# Patient Record
Sex: MALE | Race: WHITE | Employment: UNEMPLOYED | URBAN - METROPOLITAN AREA
[De-identification: names, ages, dates, MRNs, and addresses within clinical notes are randomized per-mention and may not be internally consistent; named-entity substitution may affect disease eponyms.]

---

## 2022-01-01 ENCOUNTER — HOSPITAL ENCOUNTER (EMERGENCY)
Age: 72
Discharge: HOME | End: 2022-01-01
Attending: EMERGENCY MEDICINE
Payer: MEDICARE

## 2022-01-01 ENCOUNTER — APPOINTMENT (OUTPATIENT)
Dept: GENERAL RADIOLOGY | Age: 72
End: 2022-01-01

## 2022-01-01 VITALS
HEART RATE: 86 BPM | TEMPERATURE: 97.9 F | WEIGHT: 260 LBS | OXYGEN SATURATION: 93 % | HEIGHT: 68 IN | SYSTOLIC BLOOD PRESSURE: 107 MMHG | BODY MASS INDEX: 39.4 KG/M2 | RESPIRATION RATE: 25 BRPM | DIASTOLIC BLOOD PRESSURE: 54 MMHG

## 2022-01-01 DIAGNOSIS — I95.9 HYPOTENSION, UNSPECIFIED HYPOTENSION TYPE: Primary | ICD-10-CM

## 2022-01-01 LAB
A/G RATIO: 1 (ref 1.1–2.2)
ALBUMIN SERPL-MCNC: 3.3 G/DL (ref 3.4–5)
ALP BLD-CCNC: 82 U/L (ref 40–129)
ALT SERPL-CCNC: 9 U/L (ref 10–40)
ANION GAP SERPL CALCULATED.3IONS-SCNC: 12 MMOL/L (ref 3–16)
AST SERPL-CCNC: 10 U/L (ref 15–37)
BASOPHILS ABSOLUTE: 0.1 K/UL (ref 0–0.2)
BASOPHILS RELATIVE PERCENT: 0.8 %
BILIRUB SERPL-MCNC: <0.2 MG/DL (ref 0–1)
BUN BLDV-MCNC: 22 MG/DL (ref 7–20)
CALCIUM SERPL-MCNC: 8.8 MG/DL (ref 8.3–10.6)
CHLORIDE BLD-SCNC: 100 MMOL/L (ref 99–110)
CO2: 22 MMOL/L (ref 21–32)
CREAT SERPL-MCNC: 2 MG/DL (ref 0.8–1.3)
EOSINOPHILS ABSOLUTE: 0.5 K/UL (ref 0–0.6)
EOSINOPHILS RELATIVE PERCENT: 4.6 %
GFR AFRICAN AMERICAN: 40
GFR NON-AFRICAN AMERICAN: 33
GLUCOSE BLD-MCNC: 194 MG/DL (ref 70–99)
HCT VFR BLD CALC: 31.9 % (ref 40.5–52.5)
HEMOGLOBIN: 10.3 G/DL (ref 13.5–17.5)
LYMPHOCYTES ABSOLUTE: 2.5 K/UL (ref 1–5.1)
LYMPHOCYTES RELATIVE PERCENT: 22.8 %
MCH RBC QN AUTO: 24.3 PG (ref 26–34)
MCHC RBC AUTO-ENTMCNC: 32.4 G/DL (ref 31–36)
MCV RBC AUTO: 74.9 FL (ref 80–100)
MONOCYTES ABSOLUTE: 0.9 K/UL (ref 0–1.3)
MONOCYTES RELATIVE PERCENT: 8.1 %
NEUTROPHILS ABSOLUTE: 6.9 K/UL (ref 1.7–7.7)
NEUTROPHILS RELATIVE PERCENT: 63.7 %
PDW BLD-RTO: 15.3 % (ref 12.4–15.4)
PLATELET # BLD: 335 K/UL (ref 135–450)
PMV BLD AUTO: 6.6 FL (ref 5–10.5)
POTASSIUM SERPL-SCNC: 4 MMOL/L (ref 3.5–5.1)
RBC # BLD: 4.26 M/UL (ref 4.2–5.9)
SODIUM BLD-SCNC: 134 MMOL/L (ref 136–145)
TOTAL PROTEIN: 6.7 G/DL (ref 6.4–8.2)
TROPONIN: <0.01 NG/ML
WBC # BLD: 10.8 K/UL (ref 4–11)

## 2022-01-01 PROCEDURE — 85025 COMPLETE CBC W/AUTO DIFF WBC: CPT

## 2022-01-01 PROCEDURE — 93005 ELECTROCARDIOGRAM TRACING: CPT | Performed by: PHYSICIAN ASSISTANT

## 2022-01-01 PROCEDURE — 99284 EMERGENCY DEPT VISIT MOD MDM: CPT

## 2022-01-01 PROCEDURE — 80053 COMPREHEN METABOLIC PANEL: CPT

## 2022-01-01 PROCEDURE — 84484 ASSAY OF TROPONIN QUANT: CPT

## 2022-01-01 PROCEDURE — 71045 X-RAY EXAM CHEST 1 VIEW: CPT

## 2022-01-01 RX ORDER — TRAMADOL HYDROCHLORIDE 50 MG/1
50 TABLET ORAL EVERY 6 HOURS PRN
COMMUNITY

## 2022-01-01 RX ORDER — CLOPIDOGREL BISULFATE 75 MG/1
75 TABLET ORAL DAILY
COMMUNITY

## 2022-01-01 RX ORDER — TAMSULOSIN HYDROCHLORIDE 0.4 MG/1
0.4 CAPSULE ORAL DAILY
COMMUNITY

## 2022-01-01 ASSESSMENT — ENCOUNTER SYMPTOMS
ABDOMINAL PAIN: 0
VOMITING: 0
COUGH: 0
NAUSEA: 0
DIARRHEA: 0
RHINORRHEA: 0
SHORTNESS OF BREATH: 0
WHEEZING: 0

## 2022-01-02 LAB
EKG ATRIAL RATE: 81 BPM
EKG DIAGNOSIS: NORMAL
EKG P AXIS: 16 DEGREES
EKG P-R INTERVAL: 184 MS
EKG Q-T INTERVAL: 394 MS
EKG QRS DURATION: 88 MS
EKG QTC CALCULATION (BAZETT): 457 MS
EKG R AXIS: -10 DEGREES
EKG T AXIS: 10 DEGREES
EKG VENTRICULAR RATE: 81 BPM

## 2022-01-02 PROCEDURE — 93010 ELECTROCARDIOGRAM REPORT: CPT | Performed by: INTERNAL MEDICINE

## 2022-01-02 NOTE — ED PROVIDER NOTES
"The patient location is: home  The chief complaint leading to consultation is: Patient requiring therapy for language impairment  Visit type: Virtual visit with synchronous audio and video  Total time spent with patient: 45 minutes  Each patient to whom he or she provides medical services by telemedicine is:  (1) informed of the relationship between the physician and patient and the respective role of any other health care provider with respect to management of the patient; and (2) notified that he or she may decline to receive medical services by telemedicine and may withdraw from such care at any time.    Notes:       Outpatient Pediatric Speech Therapy Daily Note    Date: 6/30/2020    Patient Name: Mp Euceda  MRN: [id number]  Therapy Diagnosis:   Encounter Diagnosis   Name Primary?    Autism spectrum disorder, requiring support, with accompanying language impairment Yes      Physician: Marlys Mon MD   Physician Orders: eval and treat   Medical Diagnosis: ASD  Age: 9  y.o. 7  m.o.    Visit # / Visits Authorized:20  Date of Evaluation:   Plan of Care Expiration Date: 12/31/20  Authorization Date: 2/5/20    Time In: 4:00 PM  Time Out: 5:00 PM  Total Billable Time: 60 min     Precautions: standard    Subjective:   Mp Euceda was seen today for speech therapy to address the above. The patient was seen via virtual visit in order to permit the patient to "shelter in place" and to reduce the risk of exposure to COVID-19.    He was compliant to home exercise program.   Response to previous treatment: good   Both parents brought Mp to therapy today.  Pain: Mp was unable to rate pain on a numeric scale, but no pain behaviors were noted in today's session.  Objective:   UNTIMED  Procedure Min.   Speech- Language- Voice Therapy    45   Total Untimed Units: 1  Charges Billed/# of units: 1  Short-term objectives:  Mp will:    1)New Goal: ABBI will identify the main idea of a passage " [facility]        Pt Name: Alessandra Gerard  MRN: [id number]  [place] 1950  Date of evaluation: 1/1/2022  Provider: Rock Zimmer PA-C  PCP: No primary care provider on file. Note Started: 9:31 PM EST        I have seen and evaluated this patient with my supervising physician Florie Fothergill, MD.    [facility]       Chief Complaint   Patient presents with    Dizziness     pt brouoght to ed by [place] [facility] for sudden onset of dizziness and low blood pressure        HISTORY OF PRESENT ILLNESS   (Location, Timing/Onset, Context/Setting, Quality, Duration, Modifying Factors, Severity, Associated Signs and Symptoms)  Note limiting factors. Chief Complaint: Jerardo Amador is a 70 y.o. male who presents for evaluation of hypotension. Patient states that about an hour prior to arrival in the ED he had just taken all of his evening meds and went to get ready for bed when he started feeling very dizzy/lightheaded, weak and diaphoretic. States that he felt this way before and felt like his blood pressure dropped and sure enough when he checked his blood pressure was 80s over 40s. EMS report systolic of 76 and gave fluid resuscitation prior to arrival in the ED. Patient states that he is already starting to feel better. He denies any chest pain or shortness of breath. States he has had similar symptoms in the past.  He denies syncope. He has no other complaints or concerns at this time. Nursing Notes were all reviewed and agreed with or any disagreements were addressed in the HPI. REVIEW OF SYSTEMS    (2-9 systems for level 4, 10 or more for level 5)     Review of Systems   Constitutional: Positive for diaphoresis. Negative for appetite change, chills and fever. HENT: Negative for congestion and rhinorrhea. Eyes: Negative for visual disturbance.    Respiratory: Negative for cough, shortness of breath and and provide 2 supporting details with 90% accuracy when provided with min cues.  2) ABBI will answer multi-part questions in entirety with 80% accuracy for punctuation and grammar.  3) New Goal: ABBI will complete character analysis, identifying character traits, feelings and changes in character in a passage with 80% accuracy when provided with mod cues.  4) Identify and utilize the accurate preposition in, at, or on, during written activities and conversational speech with 80% accuracy when provided with mod cues, across 3 consecutive sessions: Previous dataRJ completed written preposition tasks with 80% accuracy today. (progress maintained)    Goals met:  1. Answer wh- questions with 90% accuracy when provided with min cues across 3 consecutive sessions.Mp answered wh- questions today with 90% accuracy. (Goal met)  2. Demonstrate accurate use of past, present and future tense during conversational speech with 90% accuracy when provided with min cues across 3 consecutive sessions. (Goal met)  3. Demonstrate turn-taking with others during conversational speech with 90% accuracy when provided with in cues across 3 consecutive sessions.  (Goal met)  4.) ABBI will make inferences from reading passages with 90% accuracy when provided with mod cues.(goal met)  5.)ABBI will find and highlight evidence in a passage to support his answer with 90% accuracy.  6)ABBI will complete moderately complex reading tasks and answer accompanying questions with 90% accuracy (goal met)    STG 5)  6/30/20-ABBI completed reading activities today, finding and highlighing the evidence in passages to support answers to questions about what he read, with 90% accuracy when mod cues were provided. (progress made)      Long-term goals:  Mp will exhibit:  1. Age appropriate receptive language skills.  2. Age appropriate expressive language skills.  3. Age appropriate pragmatic language skills.  Patient Education/Response:     Written Home Exercises  wheezing. Cardiovascular: Negative for chest pain. Gastrointestinal: Negative for abdominal pain, diarrhea, nausea and vomiting. Genitourinary: Negative for difficulty urinating, dysuria and hematuria. Musculoskeletal: Negative for neck pain and neck stiffness. Skin: Negative for rash. Neurological: Positive for dizziness, weakness and light-headedness. Negative for syncope, numbness and headaches. Positives and Pertinent negatives as per HPI. Except as noted above in the ROS, all other systems were reviewed and negative.        PAST MEDICAL HISTORY     Past Medical History:   Diagnosis Date    [facility] [address]     Diabetes mellitus [facility] [address] [facility]     Hyperlipidemia     Hypertension          SURGICAL HISTORY     Past Surgical History:   Procedure Laterality Date    APPENDECTOMY           CURRENTMEDICATIONS       Previous Medications    ALBUTEROL (ACCUNEB) 0.63 MG/3ML NEBULIZER SOLUTION    Take 1 ampule by nebulization every 6 hours as needed for Wheezing    AMLODIPINE (NORVASC) 10 MG TABLET    Take 10 mg by mouth daily    APIXABAN (ELIQUIS) 5 MG TABS TABLET    Take by mouth 2 times daily    ATORVASTATIN (LIPITOR) 20 MG TABLET    Take 20 mg by mouth daily    BUDESONIDE-FORMOTEROL (SYMBICORT) 160-4.5 MCG/ACT AERO    Inhale 2 puffs into the lungs 2 times daily    CLOPIDOGREL (PLAVIX) 75 MG TABLET    Take 75 mg by mouth daily    FUROSEMIDE (LASIX) 40 MG TABLET    Take 40 mg by mouth daily     GLIPIZIDE (GLUCOTROL XL) 10 MG EXTENDED RELEASE TABLET    Take 10 mg by mouth 2 times daily    INSULIN GLARGINE (LANTUS) 100 UNIT/ML INJECTION VIAL    Inject 60 Units into the skin nightly     LISINOPRIL (PRINIVIL;ZESTRIL) 20 MG TABLET    Take 10 mg by mouth daily     METFORMIN (GLUCOPHAGE) 850 MG TABLET    Take 850 mg by mouth 2 times daily (with meals)    TAMSULOSIN (FLOMAX) 0.4 MG CAPSULE    Take 0.4 mg by mouth daily    TRAMADOL (ULTRAM) 50 MG TABLET    Take 50 mg by mouth every 6 Provided: yes.  Strategies / Exercises were reviewed and Mp was able to demonstrate them prior to the end of the session.  Mp demonstrated good  understanding of the education provided.     See EMR under Patient Instructions for exercises provided prior visit  Assessment:   Mp is progressing toward his goals.   Current goals remain appropriate.  Goals will be added and re-assessed as needed.      Pt prognosis is Excellent. Pt will continue to benefit from skilled outpatient speech and language therapy to address the deficits listed in the problem list on initial evaluation, provide pt/famil0 y education and to maximize pt's level of independence in the home and community environment.     Medical necessity is demonstrated by the following IMPAIRMENTS:  ABBI continues to exhibit decreased receptive and expressive language impacting his ability to communicate information pertaining to his health and safety with parents, teachers, care-givers and medical professionals.    Barriers to Therapy: none identified  Pt's spiritual, cultural and educational needs considered and pt agreeable to plan of care and goals.  Plan:   Continue Plan of Care    Manda Bolanos CCC-SLP   6/30/2020         n the home program at the conclusion of the session and verbalized agreement with treatment plan.     hours as needed for Pain. ALLERGIES     Dilaudid [hydromorphone hcl] and Hydrochlorothiazide    FAMILYHISTORY     History reviewed. No pertinent family history. SOCIAL HISTORY       Social History     Tobacco Use    Smoking status: Current Every Day Smoker     Packs/day: 0.50     Types: Cigarettes    Smokeless tobacco: Never Used   Substance Use Topics    Alcohol use: No    Drug use: No       SCREENINGS             PHYSICAL EXAM    (up to 7 for level 4, 8 or more for level 5)     ED Triage Vitals   BP Temp Temp src Pulse Resp SpO2 Height Weight   -- -- -- -- -- -- -- --       Physical Exam  Vitals and nursing note reviewed. Constitutional:       General: He is not in acute distress. Appearance: He is well-developed. He is not ill-appearing, toxic-appearing or diaphoretic. HENT:      Head: Normocephalic and atraumatic. Right Ear: External ear normal.      Left Ear: External ear normal.      Nose: Nose normal.   Eyes:      General:         Right eye: No discharge. Left eye: No discharge. Extraocular Movements: Extraocular movements intact. Conjunctiva/sclera: Conjunctivae normal.      Pupils: Pupils are equal, round, and reactive to light. Cardiovascular:      Rate and Rhythm: Normal rate. Heart sounds: Normal heart sounds. Pulmonary:      Effort: Pulmonary effort is normal. No respiratory distress. Breath sounds: Normal breath sounds. Chest:      Chest wall: No tenderness. Abdominal:      General: There is no distension. Palpations: Abdomen is soft. Tenderness: There is no abdominal tenderness. Musculoskeletal:         General: Normal range of motion. Cervical back: Normal range of motion and neck supple. Skin:     General: Skin is warm and dry. Neurological:      Mental Status: He is alert and oriented to person, place, and time. Mental status is at baseline. GCS: GCS eye subscore is 4. GCS verbal subscore is 5.  GCS motor subscore is 6. Cranial Nerves: Cranial nerves are intact. Sensory: Sensation is intact. Motor: Motor function is intact. Coordination: Coordination is intact. Psychiatric:         Behavior: Behavior normal.         DIAGNOSTIC RESULTS   LABS:    Labs Reviewed   CBC WITH AUTO DIFFERENTIAL - Abnormal; Notable for the following components:       Result Value    Hemoglobin 10.3 (*)     Hematocrit 31.9 (*)     MCV 74.9 (*)     MCH 24.3 (*)     All other components within normal limits    Narrative:     Performed at:  [facility] [address] [place]   Phone [phone]   COMPREHENSIVE METABOLIC PANEL - Abnormal; Notable for the following components:    Sodium 134 (*)     Glucose 194 (*)     BUN 22 (*)     CREATININE 2.0 (*)     GFR Non- 33 (*)     GFR  40 (*)     Albumin 3.3 (*)     Albumin/Globulin Ratio 1.0 (*)     ALT 9 (*)     AST 10 (*)     All other components within normal limits    Narrative:     Performed at:  [facility] [address] [address] [place]   Phone [phone]   TROPONIN    Narrative:     Performed at:  [facility] [address], [place], [address] [place]   Phone [phone]       When ordered only abnormal lab results are displayed. All other labs were within normal range or not returned as of this dictation. EKG: When ordered, EKG's are interpreted by the Emergency Department Physician in the absence of a cardiologist.  Please see their note for interpretation of EKG.     RADIOLOGY:   Non-plain film images such as CT, Ultrasound and MRI are read by the radiologist. Plain radiographic images are visualized and preliminarily interpreted by the ED Provider with the below findings:        Interpretation per the Radiologist below, if available at the time of this note:    XR CHEST PORTABLE   Final Result   Marginal inspiration, without evidence of acute cardiopulmonary disease           No results found. PROCEDURES   Unless otherwise noted below, none     Procedures    CRITICAL CARE TIME   N/A    CONSULTS:  None      EMERGENCY DEPARTMENT COURSE and DIFFERENTIAL DIAGNOSIS/MDM:   Vitals:    Vitals:    01/01/22 2145 01/01/22 2149 01/01/22 2200 01/01/22 2215   BP: (!) 99/57 (!) 85/57 (!) 121/58 (!) 117/54   Pulse: 90 89 85 81   Resp: 20 17 18 15   Temp:       TempSrc:       SpO2: 93% 92% 92% 90%   Weight:       Height:           Patient was given the following medications:  Medications - No data to display        Patient presents for evaluation of hypotension and lightheadedness. On exam, he is resting comfortably in bed no acute distress and nontoxic. Vitals are stable and he is afebrile. Lungs are clear to auscultation bilaterally, chest is nontender and abdomen is benign. He is alert and oriented x3. GCS 15. Cranial nerves II through XII are intact. He is moving all extremities well difficulty or focal logic deficit. 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 Normally receives his care through the [place]. Patient artery received approximately 800 cc of fluid prior to arrival in the ED and had improvement in symptoms and resolution of hypotension. Please see attending note for EKG interpretation. CBC and CMP are remarkable for chronic kidney disease and creatinine of 2.0. He is hyperglycemic at 194 with no evidence of diabetic ketoacidosis. Troponin is negative. Chest x-ray is negative. Patient had normal pressures throughout entire stay in the ED. Asymptomatic with ambulation. I believe he can be safely discharged home at this time. Encouraged to hold his lisinopril x48 hours and follow-up with his PCP through the [place].     At this time there is no indication of head injury, encephalopathy, multiple sclerosis, TIA/CVA, seizures, dehydration, hypoglycemia, mass lesions, metabolic cause, cardiac arrhythmia, PE, GI bleeding or orthostatic hypotension. Patient is stable throughout ED course and safe for outpatient follow-up. Patient made aware of treatment plan and verbally understood and agreed. Patient stable for outpatient follow-up with their family doctor in 24-48 hours. He is agreeable to this plan stable for discharge at this time. FINAL IMPRESSION      1.  Hypotension, unspecified hypotension type          DISPOSITION/PLAN   DISPOSITION Decision To Discharge 01/01/2022 10:37:58 PM      PATIENT REFERRED TO:  your PCP through the [place]    Schedule an appointment as soon as possible for a visit       [facility] Emergency Department  [place] [address]  [phone]  Go to   If symptoms worsen      DISCHARGE MEDICATIONS:  New Prescriptions    No medications on file       DISCONTINUED MEDICATIONS:  Discontinued Medications    No medications on file              (Please note that portions of this note were completed with a voice recognition program.  Efforts were made to edit the dictations but occasionally words are mis-transcribed.)    Loida Hinton PA-C (electronically signed)           Chely Dobbs PA-C  01/01/22 8349

## 2022-01-02 NOTE — ED NOTES
Bed: 01  Expected date:   Expected time:   Means of arrival:   Comments:  laurie Blanchard RN  01/01/22 2125

## 2022-01-02 NOTE — ED NOTES
Discharge instructions given, patient acknowledged understanding, patient ambulated out of ed upon discharge      Alyx Luke RN  01/01/22 Danielito KELLER [address]

## 2022-01-02 NOTE — ED PROVIDER NOTES
As physician-in-triage, I performed a medical screening history and physical exam on William Sandoval. I also cared for and evaluated the patient in conjunction with the ED Advanced Practice Provider. All diagnostic, treatment, and disposition decisions were made by myself in conjunction with the advanced practice provider. For all further details of the patient's emergency department visit, please see the advanced practice provider's documentation. Presents to the ER for evaluation of orthostatic hypotension has had a prior history of orthostatic hypotension of taking his blood pressures and had some dizziness and was concerned about this. He has no headache no active chest pain pressure acute shortness breath. Mode of arrival is EMS. Acute on chronic orthostatic hypotension medication side effect in the setting of acute on chronic renal sufficiency.   Discontinue ACE inhibitor, hold blood pressure meds for the next 24 hours and outpatient follow-up with nephrology    Impression: Orthostatic hypotension, acute on chronic renal insufficiency     Tonny Acosta MD  45/30/76 1045       Tonny Acosta MD  51/00/69 1042

## 2024-08-18 ENCOUNTER — APPOINTMENT (OUTPATIENT)
Dept: CT IMAGING | Age: 74
DRG: 871 | End: 2024-08-18
Payer: MEDICARE

## 2024-08-18 ENCOUNTER — APPOINTMENT (OUTPATIENT)
Dept: GENERAL RADIOLOGY | Age: 74
DRG: 871 | End: 2024-08-18
Payer: MEDICARE

## 2024-08-18 ENCOUNTER — HOSPITAL ENCOUNTER (INPATIENT)
Age: 74
LOS: 9 days | Discharge: HOME HEALTH | DRG: 871 | End: 2024-08-27
Attending: STUDENT IN AN ORGANIZED HEALTH CARE EDUCATION/TRAINING PROGRAM | Admitting: STUDENT IN AN ORGANIZED HEALTH CARE EDUCATION/TRAINING PROGRAM
Payer: MEDICARE

## 2024-08-18 DIAGNOSIS — E87.20 LACTIC ACIDOSIS: ICD-10-CM

## 2024-08-18 DIAGNOSIS — A41.9 SEPTICEMIA (HCC): Primary | ICD-10-CM

## 2024-08-18 DIAGNOSIS — L03.116 CELLULITIS OF LEFT LEG: ICD-10-CM

## 2024-08-18 PROBLEM — L03.90 SEPSIS DUE TO CELLULITIS (HCC): Status: ACTIVE | Noted: 2024-08-18

## 2024-08-18 LAB
ALBUMIN SERPL-MCNC: 3.4 G/DL (ref 3.4–5)
ALBUMIN/GLOB SERPL: 0.8 {RATIO} (ref 1.1–2.2)
ALP SERPL-CCNC: 73 U/L (ref 40–129)
ALT SERPL-CCNC: 7 U/L (ref 10–40)
ANION GAP SERPL CALCULATED.3IONS-SCNC: 14 MMOL/L (ref 3–16)
APTT BLD: 33.2 SEC (ref 22.1–36.4)
AST SERPL-CCNC: 11 U/L (ref 15–37)
BASOPHILS # BLD: 0 K/UL (ref 0–0.2)
BASOPHILS NFR BLD: 0.2 %
BILIRUB SERPL-MCNC: 0.4 MG/DL (ref 0–1)
BUN SERPL-MCNC: 25 MG/DL (ref 7–20)
CALCIUM SERPL-MCNC: 8.7 MG/DL (ref 8.3–10.6)
CHLORIDE SERPL-SCNC: 102 MMOL/L (ref 99–110)
CK SERPL-CCNC: 139 U/L (ref 39–308)
CO2 SERPL-SCNC: 18 MMOL/L (ref 21–32)
CREAT SERPL-MCNC: 2.2 MG/DL (ref 0.8–1.3)
DEPRECATED RDW RBC AUTO: 17.3 % (ref 12.4–15.4)
EOSINOPHIL # BLD: 0 K/UL (ref 0–0.6)
EOSINOPHIL NFR BLD: 0 %
GFR SERPLBLD CREATININE-BSD FMLA CKD-EPI: 31 ML/MIN/{1.73_M2}
GLUCOSE SERPL-MCNC: 291 MG/DL (ref 70–99)
HCT VFR BLD AUTO: 35.3 % (ref 40.5–52.5)
HGB BLD-MCNC: 11.3 G/DL (ref 13.5–17.5)
INR PPP: 1.45 (ref 0.85–1.15)
LACTATE BLDV-SCNC: 3 MMOL/L (ref 0.4–1.9)
LYMPHOCYTES # BLD: 0.5 K/UL (ref 1–5.1)
LYMPHOCYTES NFR BLD: 2.8 %
MAGNESIUM SERPL-MCNC: 1.7 MG/DL (ref 1.8–2.4)
MCH RBC QN AUTO: 24.2 PG (ref 26–34)
MCHC RBC AUTO-ENTMCNC: 31.9 G/DL (ref 31–36)
MCV RBC AUTO: 75.9 FL (ref 80–100)
MONOCYTES # BLD: 0.8 K/UL (ref 0–1.3)
MONOCYTES NFR BLD: 4.6 %
NEUTROPHILS # BLD: 16.4 K/UL (ref 1.7–7.7)
NEUTROPHILS NFR BLD: 92.4 %
PHOSPHATE SERPL-MCNC: 2 MG/DL (ref 2.5–4.9)
PLATELET # BLD AUTO: 264 K/UL (ref 135–450)
PMV BLD AUTO: 7.3 FL (ref 5–10.5)
POTASSIUM SERPL-SCNC: 3.8 MMOL/L (ref 3.5–5.1)
PROCALCITONIN SERPL IA-MCNC: 2.24 NG/ML (ref 0–0.15)
PROT SERPL-MCNC: 7.9 G/DL (ref 6.4–8.2)
PROTHROMBIN TIME: 17.8 SEC (ref 11.9–14.9)
RBC # BLD AUTO: 4.65 M/UL (ref 4.2–5.9)
SODIUM SERPL-SCNC: 134 MMOL/L (ref 136–145)
TROPONIN, HIGH SENSITIVITY: 34 NG/L (ref 0–22)
WBC # BLD AUTO: 17.8 K/UL (ref 4–11)

## 2024-08-18 PROCEDURE — 80053 COMPREHEN METABOLIC PANEL: CPT

## 2024-08-18 PROCEDURE — 83735 ASSAY OF MAGNESIUM: CPT

## 2024-08-18 PROCEDURE — 81001 URINALYSIS AUTO W/SCOPE: CPT

## 2024-08-18 PROCEDURE — 84484 ASSAY OF TROPONIN QUANT: CPT

## 2024-08-18 PROCEDURE — 2060000000 HC ICU INTERMEDIATE R&B

## 2024-08-18 PROCEDURE — 6360000002 HC RX W HCPCS: Performed by: STUDENT IN AN ORGANIZED HEALTH CARE EDUCATION/TRAINING PROGRAM

## 2024-08-18 PROCEDURE — 84145 PROCALCITONIN (PCT): CPT

## 2024-08-18 PROCEDURE — 83605 ASSAY OF LACTIC ACID: CPT

## 2024-08-18 PROCEDURE — 82550 ASSAY OF CK (CPK): CPT

## 2024-08-18 PROCEDURE — 85610 PROTHROMBIN TIME: CPT

## 2024-08-18 PROCEDURE — 96361 HYDRATE IV INFUSION ADD-ON: CPT

## 2024-08-18 PROCEDURE — 85025 COMPLETE CBC W/AUTO DIFF WBC: CPT

## 2024-08-18 PROCEDURE — 6360000004 HC RX CONTRAST MEDICATION: Performed by: STUDENT IN AN ORGANIZED HEALTH CARE EDUCATION/TRAINING PROGRAM

## 2024-08-18 PROCEDURE — 96375 TX/PRO/DX INJ NEW DRUG ADDON: CPT

## 2024-08-18 PROCEDURE — 36415 COLL VENOUS BLD VENIPUNCTURE: CPT

## 2024-08-18 PROCEDURE — 99285 EMERGENCY DEPT VISIT HI MDM: CPT

## 2024-08-18 PROCEDURE — 93005 ELECTROCARDIOGRAM TRACING: CPT | Performed by: STUDENT IN AN ORGANIZED HEALTH CARE EDUCATION/TRAINING PROGRAM

## 2024-08-18 PROCEDURE — 71260 CT THORAX DX C+: CPT

## 2024-08-18 PROCEDURE — 85730 THROMBOPLASTIN TIME PARTIAL: CPT

## 2024-08-18 PROCEDURE — 96365 THER/PROPH/DIAG IV INF INIT: CPT

## 2024-08-18 PROCEDURE — 70450 CT HEAD/BRAIN W/O DYE: CPT

## 2024-08-18 PROCEDURE — 84100 ASSAY OF PHOSPHORUS: CPT

## 2024-08-18 PROCEDURE — 2580000003 HC RX 258: Performed by: STUDENT IN AN ORGANIZED HEALTH CARE EDUCATION/TRAINING PROGRAM

## 2024-08-18 PROCEDURE — 71045 X-RAY EXAM CHEST 1 VIEW: CPT

## 2024-08-18 PROCEDURE — 87040 BLOOD CULTURE FOR BACTERIA: CPT

## 2024-08-18 RX ORDER — SODIUM CHLORIDE, SODIUM LACTATE, POTASSIUM CHLORIDE, AND CALCIUM CHLORIDE .6; .31; .03; .02 G/100ML; G/100ML; G/100ML; G/100ML
30 INJECTION, SOLUTION INTRAVENOUS ONCE
Status: COMPLETED | OUTPATIENT
Start: 2024-08-18 | End: 2024-08-18

## 2024-08-18 RX ORDER — IOPAMIDOL 755 MG/ML
75 INJECTION, SOLUTION INTRAVASCULAR
Status: COMPLETED | OUTPATIENT
Start: 2024-08-18 | End: 2024-08-18

## 2024-08-18 RX ADMIN — SODIUM CHLORIDE, POTASSIUM CHLORIDE, SODIUM LACTATE AND CALCIUM CHLORIDE 2052 ML: 600; 310; 30; 20 INJECTION, SOLUTION INTRAVENOUS at 20:56

## 2024-08-18 RX ADMIN — IOPAMIDOL 75 ML: 755 INJECTION, SOLUTION INTRAVENOUS at 21:51

## 2024-08-18 RX ADMIN — CEFEPIME 2000 MG: 2 INJECTION, POWDER, FOR SOLUTION INTRAVENOUS at 20:57

## 2024-08-18 RX ADMIN — VANCOMYCIN HYDROCHLORIDE 2500 MG: 1 INJECTION, POWDER, LYOPHILIZED, FOR SOLUTION INTRAVENOUS at 21:37

## 2024-08-18 ASSESSMENT — PAIN - FUNCTIONAL ASSESSMENT: PAIN_FUNCTIONAL_ASSESSMENT: NONE - DENIES PAIN

## 2024-08-19 ENCOUNTER — APPOINTMENT (OUTPATIENT)
Dept: MRI IMAGING | Age: 74
DRG: 871 | End: 2024-08-19
Payer: MEDICARE

## 2024-08-19 PROBLEM — A41.9 SEPTICEMIA (HCC): Status: ACTIVE | Noted: 2024-08-19

## 2024-08-19 PROBLEM — L03.116 CELLULITIS OF LEFT LEG: Status: ACTIVE | Noted: 2024-08-19

## 2024-08-19 PROBLEM — G47.30 OBESITY WITH SLEEP APNEA: Status: ACTIVE | Noted: 2024-08-19

## 2024-08-19 PROBLEM — I25.10 CAD (CORONARY ARTERY DISEASE): Status: ACTIVE | Noted: 2024-08-19

## 2024-08-19 PROBLEM — E87.20 LACTIC ACIDOSIS: Status: ACTIVE | Noted: 2024-08-19

## 2024-08-19 PROBLEM — J44.1 COPD EXACERBATION (HCC): Status: ACTIVE | Noted: 2024-08-19

## 2024-08-19 PROBLEM — E83.39 HYPOPHOSPHATEMIA: Status: ACTIVE | Noted: 2024-08-19

## 2024-08-19 PROBLEM — R79.89 ELEVATED TROPONIN: Status: ACTIVE | Noted: 2024-08-19

## 2024-08-19 PROBLEM — R70.0 ELEVATED SED RATE: Status: ACTIVE | Noted: 2024-08-19

## 2024-08-19 PROBLEM — E66.9 OBESITY WITH SLEEP APNEA: Status: ACTIVE | Noted: 2024-08-19

## 2024-08-19 PROBLEM — E11.9 TYPE 2 DIABETES MELLITUS (HCC): Status: ACTIVE | Noted: 2024-08-19

## 2024-08-19 PROBLEM — E83.42 HYPOMAGNESEMIA: Status: ACTIVE | Noted: 2024-08-19

## 2024-08-19 PROBLEM — R79.89 ELEVATED LACTIC ACID LEVEL: Status: ACTIVE | Noted: 2024-08-19

## 2024-08-19 PROBLEM — R79.82 CRP ELEVATED: Status: ACTIVE | Noted: 2024-08-19

## 2024-08-19 PROBLEM — N17.9 ACUTE KIDNEY INJURY SUPERIMPOSED ON CHRONIC KIDNEY DISEASE (HCC): Status: ACTIVE | Noted: 2024-08-19

## 2024-08-19 PROBLEM — J96.21 ACUTE ON CHRONIC RESPIRATORY FAILURE WITH HYPOXIA (HCC): Status: ACTIVE | Noted: 2024-08-19

## 2024-08-19 PROBLEM — N18.9 ACUTE KIDNEY INJURY SUPERIMPOSED ON CHRONIC KIDNEY DISEASE (HCC): Status: ACTIVE | Noted: 2024-08-19

## 2024-08-19 LAB
ANION GAP SERPL CALCULATED.3IONS-SCNC: 12 MMOL/L (ref 3–16)
BACTERIA URNS QL MICRO: ABNORMAL /HPF
BASE EXCESS BLDV CALC-SCNC: 0.4 MMOL/L (ref -3–3)
BASOPHILS # BLD: 0 K/UL (ref 0–0.2)
BASOPHILS NFR BLD: 0.3 %
BILIRUB UR QL STRIP.AUTO: NEGATIVE
BUN SERPL-MCNC: 20 MG/DL (ref 7–20)
CALCIUM SERPL-MCNC: 8.1 MG/DL (ref 8.3–10.6)
CHLORIDE SERPL-SCNC: 103 MMOL/L (ref 99–110)
CK SERPL-CCNC: 300 U/L (ref 39–308)
CLARITY UR: CLEAR
CO2 BLDV-SCNC: 60 MMOL/L
CO2 SERPL-SCNC: 20 MMOL/L (ref 21–32)
COHGB MFR BLDV: 2.6 % (ref 0–1.5)
COLOR UR: YELLOW
CREAT SERPL-MCNC: 1.8 MG/DL (ref 0.8–1.3)
CRP SERPL-MCNC: 185.4 MG/L (ref 0–5.1)
DEPRECATED RDW RBC AUTO: 17.7 % (ref 12.4–15.4)
DO-HGB MFR BLDV: 19 %
EKG ATRIAL RATE: 109 BPM
EKG DIAGNOSIS: NORMAL
EKG P AXIS: 13 DEGREES
EKG P-R INTERVAL: 128 MS
EKG Q-T INTERVAL: 322 MS
EKG QRS DURATION: 88 MS
EKG QTC CALCULATION (BAZETT): 433 MS
EKG R AXIS: -17 DEGREES
EKG T AXIS: 72 DEGREES
EKG VENTRICULAR RATE: 109 BPM
EOSINOPHIL # BLD: 0 K/UL (ref 0–0.6)
EOSINOPHIL NFR BLD: 0 %
EPI CELLS #/AREA URNS AUTO: 1 /HPF (ref 0–5)
ERYTHROCYTE [SEDIMENTATION RATE] IN BLOOD BY WESTERGREN METHOD: 62 MM/HR (ref 0–20)
FLUAV RNA RESP QL NAA+PROBE: NOT DETECTED
FLUBV RNA RESP QL NAA+PROBE: NOT DETECTED
GFR SERPLBLD CREATININE-BSD FMLA CKD-EPI: 39 ML/MIN/{1.73_M2}
GLUCOSE BLD-MCNC: 183 MG/DL (ref 70–99)
GLUCOSE BLD-MCNC: 192 MG/DL (ref 70–99)
GLUCOSE BLD-MCNC: 347 MG/DL (ref 70–99)
GLUCOSE BLD-MCNC: 503 MG/DL (ref 70–99)
GLUCOSE SERPL-MCNC: 186 MG/DL (ref 70–99)
GLUCOSE UR STRIP.AUTO-MCNC: 100 MG/DL
HCO3 BLDV-SCNC: 25.5 MMOL/L (ref 23–29)
HCT VFR BLD AUTO: 31.5 % (ref 40.5–52.5)
HGB BLD-MCNC: 10.3 G/DL (ref 13.5–17.5)
HGB UR QL STRIP.AUTO: ABNORMAL
HYALINE CASTS #/AREA URNS AUTO: 4 /LPF (ref 0–8)
KETONES UR STRIP.AUTO-MCNC: NEGATIVE MG/DL
LACTATE BLDV-SCNC: 1.2 MMOL/L (ref 0.4–2)
LACTATE BLDV-SCNC: 1.4 MMOL/L (ref 0.4–1.9)
LEUKOCYTE ESTERASE UR QL STRIP.AUTO: ABNORMAL
LYMPHOCYTES # BLD: 0.5 K/UL (ref 1–5.1)
LYMPHOCYTES NFR BLD: 3.4 %
MCH RBC QN AUTO: 24.8 PG (ref 26–34)
MCHC RBC AUTO-ENTMCNC: 32.7 G/DL (ref 31–36)
MCV RBC AUTO: 75.9 FL (ref 80–100)
METHGB MFR BLDV: 0.7 %
MONOCYTES # BLD: 0.5 K/UL (ref 0–1.3)
MONOCYTES NFR BLD: 3.7 %
NEUTROPHILS # BLD: 13.7 K/UL (ref 1.7–7.7)
NEUTROPHILS NFR BLD: 92.6 %
NITRITE UR QL STRIP.AUTO: NEGATIVE
NT-PROBNP SERPL-MCNC: 3616 PG/ML (ref 0–124)
O2 CT VFR BLDV CALC: 11 VOL %
O2 THERAPY: ABNORMAL
PCO2 BLDV: 42.5 MMHG (ref 40–50)
PERFORMED ON: ABNORMAL
PH BLDV: 7.39 [PH] (ref 7.35–7.45)
PH UR STRIP.AUTO: 5.5 [PH] (ref 5–8)
PHOSPHATE SERPL-MCNC: 3.6 MG/DL (ref 2.5–4.9)
PLATELET # BLD AUTO: 210 K/UL (ref 135–450)
PMV BLD AUTO: 6.7 FL (ref 5–10.5)
PO2 BLDV: 43.7 MMHG (ref 25–40)
POTASSIUM SERPL-SCNC: 3.7 MMOL/L (ref 3.5–5.1)
PROT UR STRIP.AUTO-MCNC: 30 MG/DL
RBC # BLD AUTO: 4.14 M/UL (ref 4.2–5.9)
RBC CLUMPS #/AREA URNS AUTO: 74 /HPF (ref 0–4)
SAO2 % BLDV: 80 %
SARS-COV-2 RNA RESP QL NAA+PROBE: NOT DETECTED
SODIUM SERPL-SCNC: 135 MMOL/L (ref 136–145)
SP GR UR STRIP.AUTO: 1.02 (ref 1–1.03)
TROPONIN, HIGH SENSITIVITY: 35 NG/L (ref 0–22)
UA COMPLETE W REFLEX CULTURE PNL UR: ABNORMAL
UA DIPSTICK W REFLEX MICRO PNL UR: YES
URN SPEC COLLECT METH UR: ABNORMAL
UROBILINOGEN UR STRIP-ACNC: 0.2 E.U./DL
VANCOMYCIN SERPL-MCNC: 11.4 UG/ML
WBC # BLD AUTO: 14.8 K/UL (ref 4–11)
WBC #/AREA URNS AUTO: 6 /HPF (ref 0–5)

## 2024-08-19 PROCEDURE — 5A09457 ASSISTANCE WITH RESPIRATORY VENTILATION, 24-96 CONSECUTIVE HOURS, CONTINUOUS POSITIVE AIRWAY PRESSURE: ICD-10-PCS | Performed by: INTERNAL MEDICINE

## 2024-08-19 PROCEDURE — 2580000003 HC RX 258: Performed by: NURSE PRACTITIONER

## 2024-08-19 PROCEDURE — 94640 AIRWAY INHALATION TREATMENT: CPT

## 2024-08-19 PROCEDURE — 85025 COMPLETE CBC W/AUTO DIFF WBC: CPT

## 2024-08-19 PROCEDURE — 80202 ASSAY OF VANCOMYCIN: CPT

## 2024-08-19 PROCEDURE — 2500000003 HC RX 250 WO HCPCS: Performed by: STUDENT IN AN ORGANIZED HEALTH CARE EDUCATION/TRAINING PROGRAM

## 2024-08-19 PROCEDURE — 84100 ASSAY OF PHOSPHORUS: CPT

## 2024-08-19 PROCEDURE — 93010 ELECTROCARDIOGRAM REPORT: CPT | Performed by: INTERNAL MEDICINE

## 2024-08-19 PROCEDURE — 99255 IP/OBS CONSLTJ NEW/EST HI 80: CPT | Performed by: INTERNAL MEDICINE

## 2024-08-19 PROCEDURE — 6360000002 HC RX W HCPCS: Performed by: STUDENT IN AN ORGANIZED HEALTH CARE EDUCATION/TRAINING PROGRAM

## 2024-08-19 PROCEDURE — 80048 BASIC METABOLIC PNL TOTAL CA: CPT

## 2024-08-19 PROCEDURE — 2580000003 HC RX 258: Performed by: STUDENT IN AN ORGANIZED HEALTH CARE EDUCATION/TRAINING PROGRAM

## 2024-08-19 PROCEDURE — 36415 COLL VENOUS BLD VENIPUNCTURE: CPT

## 2024-08-19 PROCEDURE — 94761 N-INVAS EAR/PLS OXIMETRY MLT: CPT

## 2024-08-19 PROCEDURE — 83880 ASSAY OF NATRIURETIC PEPTIDE: CPT

## 2024-08-19 PROCEDURE — 2580000003 HC RX 258: Performed by: INTERNAL MEDICINE

## 2024-08-19 PROCEDURE — 2700000000 HC OXYGEN THERAPY PER DAY

## 2024-08-19 PROCEDURE — 6360000002 HC RX W HCPCS: Performed by: NURSE PRACTITIONER

## 2024-08-19 PROCEDURE — 87077 CULTURE AEROBIC IDENTIFY: CPT

## 2024-08-19 PROCEDURE — 87205 SMEAR GRAM STAIN: CPT

## 2024-08-19 PROCEDURE — 73718 MRI LOWER EXTREMITY W/O DYE: CPT

## 2024-08-19 PROCEDURE — 87070 CULTURE OTHR SPECIMN AEROBIC: CPT

## 2024-08-19 PROCEDURE — 6370000000 HC RX 637 (ALT 250 FOR IP): Performed by: NURSE PRACTITIONER

## 2024-08-19 PROCEDURE — 85652 RBC SED RATE AUTOMATED: CPT

## 2024-08-19 PROCEDURE — 82550 ASSAY OF CK (CPK): CPT

## 2024-08-19 PROCEDURE — 2060000000 HC ICU INTERMEDIATE R&B

## 2024-08-19 PROCEDURE — 6370000000 HC RX 637 (ALT 250 FOR IP): Performed by: INTERNAL MEDICINE

## 2024-08-19 PROCEDURE — 83605 ASSAY OF LACTIC ACID: CPT

## 2024-08-19 PROCEDURE — 6360000002 HC RX W HCPCS: Performed by: INTERNAL MEDICINE

## 2024-08-19 PROCEDURE — 82803 BLOOD GASES ANY COMBINATION: CPT

## 2024-08-19 PROCEDURE — 87636 SARSCOV2 & INF A&B AMP PRB: CPT

## 2024-08-19 PROCEDURE — 86140 C-REACTIVE PROTEIN: CPT

## 2024-08-19 PROCEDURE — 87186 SC STD MICRODIL/AGAR DIL: CPT

## 2024-08-19 PROCEDURE — 84484 ASSAY OF TROPONIN QUANT: CPT

## 2024-08-19 PROCEDURE — 94660 CPAP INITIATION&MGMT: CPT

## 2024-08-19 RX ORDER — NICOTINE 21 MG/24HR
1 PATCH, TRANSDERMAL 24 HOURS TRANSDERMAL DAILY
Status: DISCONTINUED | OUTPATIENT
Start: 2024-08-19 | End: 2024-08-27 | Stop reason: HOSPADM

## 2024-08-19 RX ORDER — BUDESONIDE AND FORMOTEROL FUMARATE DIHYDRATE 160; 4.5 UG/1; UG/1
2 AEROSOL RESPIRATORY (INHALATION) 2 TIMES DAILY
Status: DISCONTINUED | OUTPATIENT
Start: 2024-08-19 | End: 2024-08-27 | Stop reason: HOSPADM

## 2024-08-19 RX ORDER — CLOPIDOGREL BISULFATE 75 MG/1
75 TABLET ORAL DAILY
Status: DISCONTINUED | OUTPATIENT
Start: 2024-08-19 | End: 2024-08-27 | Stop reason: HOSPADM

## 2024-08-19 RX ORDER — ONDANSETRON 4 MG/1
4 TABLET, ORALLY DISINTEGRATING ORAL EVERY 8 HOURS PRN
Status: DISCONTINUED | OUTPATIENT
Start: 2024-08-19 | End: 2024-08-27 | Stop reason: HOSPADM

## 2024-08-19 RX ORDER — MAGNESIUM SULFATE IN WATER 40 MG/ML
2000 INJECTION, SOLUTION INTRAVENOUS ONCE
Status: COMPLETED | OUTPATIENT
Start: 2024-08-19 | End: 2024-08-19

## 2024-08-19 RX ORDER — TRAMADOL HYDROCHLORIDE 50 MG/1
50 TABLET ORAL EVERY 6 HOURS PRN
Status: DISCONTINUED | OUTPATIENT
Start: 2024-08-19 | End: 2024-08-27 | Stop reason: HOSPADM

## 2024-08-19 RX ORDER — INSULIN GLARGINE 100 [IU]/ML
60 INJECTION, SOLUTION SUBCUTANEOUS NIGHTLY
Status: DISCONTINUED | OUTPATIENT
Start: 2024-08-19 | End: 2024-08-27 | Stop reason: HOSPADM

## 2024-08-19 RX ORDER — ACETAMINOPHEN 650 MG/1
650 SUPPOSITORY RECTAL EVERY 6 HOURS PRN
Status: DISCONTINUED | OUTPATIENT
Start: 2024-08-19 | End: 2024-08-27 | Stop reason: HOSPADM

## 2024-08-19 RX ORDER — SODIUM CHLORIDE 0.9 % (FLUSH) 0.9 %
5-40 SYRINGE (ML) INJECTION EVERY 12 HOURS SCHEDULED
Status: DISCONTINUED | OUTPATIENT
Start: 2024-08-19 | End: 2024-08-27 | Stop reason: HOSPADM

## 2024-08-19 RX ORDER — INSULIN LISPRO 100 [IU]/ML
0-8 INJECTION, SOLUTION INTRAVENOUS; SUBCUTANEOUS
Status: DISCONTINUED | OUTPATIENT
Start: 2024-08-19 | End: 2024-08-20

## 2024-08-19 RX ORDER — TAMSULOSIN HYDROCHLORIDE 0.4 MG/1
0.4 CAPSULE ORAL DAILY
Status: DISCONTINUED | OUTPATIENT
Start: 2024-08-19 | End: 2024-08-27 | Stop reason: HOSPADM

## 2024-08-19 RX ORDER — POTASSIUM CHLORIDE 7.45 MG/ML
10 INJECTION INTRAVENOUS ONCE
Status: DISCONTINUED | OUTPATIENT
Start: 2024-08-19 | End: 2024-08-19

## 2024-08-19 RX ORDER — POLYETHYLENE GLYCOL 3350 17 G/17G
17 POWDER, FOR SOLUTION ORAL DAILY PRN
Status: DISCONTINUED | OUTPATIENT
Start: 2024-08-19 | End: 2024-08-27 | Stop reason: HOSPADM

## 2024-08-19 RX ORDER — INSULIN LISPRO 100 [IU]/ML
10 INJECTION, SOLUTION INTRAVENOUS; SUBCUTANEOUS ONCE
Status: COMPLETED | OUTPATIENT
Start: 2024-08-19 | End: 2024-08-19

## 2024-08-19 RX ORDER — MAGNESIUM SULFATE IN WATER 40 MG/ML
2000 INJECTION, SOLUTION INTRAVENOUS PRN
Status: DISCONTINUED | OUTPATIENT
Start: 2024-08-19 | End: 2024-08-27 | Stop reason: HOSPADM

## 2024-08-19 RX ORDER — SODIUM CHLORIDE 9 MG/ML
INJECTION, SOLUTION INTRAVENOUS PRN
Status: DISCONTINUED | OUTPATIENT
Start: 2024-08-19 | End: 2024-08-27 | Stop reason: HOSPADM

## 2024-08-19 RX ORDER — INSULIN LISPRO 100 [IU]/ML
0-4 INJECTION, SOLUTION INTRAVENOUS; SUBCUTANEOUS NIGHTLY
Status: DISCONTINUED | OUTPATIENT
Start: 2024-08-19 | End: 2024-08-20

## 2024-08-19 RX ORDER — ONDANSETRON 2 MG/ML
4 INJECTION INTRAMUSCULAR; INTRAVENOUS EVERY 6 HOURS PRN
Status: DISCONTINUED | OUTPATIENT
Start: 2024-08-19 | End: 2024-08-27 | Stop reason: HOSPADM

## 2024-08-19 RX ORDER — ALBUTEROL SULFATE 0.83 MG/ML
2.5 SOLUTION RESPIRATORY (INHALATION) EVERY 6 HOURS PRN
Status: DISCONTINUED | OUTPATIENT
Start: 2024-08-19 | End: 2024-08-25

## 2024-08-19 RX ORDER — ACETAMINOPHEN 325 MG/1
650 TABLET ORAL EVERY 6 HOURS PRN
Status: DISCONTINUED | OUTPATIENT
Start: 2024-08-19 | End: 2024-08-27 | Stop reason: HOSPADM

## 2024-08-19 RX ORDER — AMLODIPINE BESYLATE 5 MG/1
10 TABLET ORAL DAILY
Status: DISCONTINUED | OUTPATIENT
Start: 2024-08-19 | End: 2024-08-19

## 2024-08-19 RX ORDER — ALBUTEROL SULFATE 0.83 MG/ML
2.5 SOLUTION RESPIRATORY (INHALATION)
Status: DISCONTINUED | OUTPATIENT
Start: 2024-08-19 | End: 2024-08-20

## 2024-08-19 RX ORDER — INSULIN GLARGINE 100 [IU]/ML
60 INJECTION, SOLUTION SUBCUTANEOUS NIGHTLY
Status: DISCONTINUED | OUTPATIENT
Start: 2024-08-19 | End: 2024-08-19

## 2024-08-19 RX ORDER — SODIUM CHLORIDE 0.9 % (FLUSH) 0.9 %
5-40 SYRINGE (ML) INJECTION PRN
Status: DISCONTINUED | OUTPATIENT
Start: 2024-08-19 | End: 2024-08-27 | Stop reason: HOSPADM

## 2024-08-19 RX ORDER — GABAPENTIN 300 MG/1
300 CAPSULE ORAL 3 TIMES DAILY
Status: DISCONTINUED | OUTPATIENT
Start: 2024-08-19 | End: 2024-08-27 | Stop reason: HOSPADM

## 2024-08-19 RX ORDER — SODIUM CHLORIDE 9 MG/ML
INJECTION, SOLUTION INTRAVENOUS CONTINUOUS
Status: ACTIVE | OUTPATIENT
Start: 2024-08-19 | End: 2024-08-19

## 2024-08-19 RX ORDER — LABETALOL HYDROCHLORIDE 5 MG/ML
10 INJECTION, SOLUTION INTRAVENOUS EVERY 4 HOURS PRN
Status: DISCONTINUED | OUTPATIENT
Start: 2024-08-19 | End: 2024-08-27 | Stop reason: HOSPADM

## 2024-08-19 RX ORDER — GLUCAGON 1 MG/ML
1 KIT INJECTION PRN
Status: DISCONTINUED | OUTPATIENT
Start: 2024-08-19 | End: 2024-08-27 | Stop reason: HOSPADM

## 2024-08-19 RX ORDER — DEXTROSE MONOHYDRATE 100 MG/ML
INJECTION, SOLUTION INTRAVENOUS CONTINUOUS PRN
Status: DISCONTINUED | OUTPATIENT
Start: 2024-08-19 | End: 2024-08-27 | Stop reason: HOSPADM

## 2024-08-19 RX ORDER — ATORVASTATIN CALCIUM 20 MG/1
20 TABLET, FILM COATED ORAL DAILY
Status: DISCONTINUED | OUTPATIENT
Start: 2024-08-19 | End: 2024-08-27 | Stop reason: HOSPADM

## 2024-08-19 RX ADMIN — BUDESONIDE AND FORMOTEROL FUMARATE DIHYDRATE 2 PUFF: 160; 4.5 AEROSOL RESPIRATORY (INHALATION) at 08:02

## 2024-08-19 RX ADMIN — ALBUTEROL SULFATE 2.5 MG: 2.5 SOLUTION RESPIRATORY (INHALATION) at 08:00

## 2024-08-19 RX ADMIN — APIXABAN 5 MG: 5 TABLET, FILM COATED ORAL at 03:23

## 2024-08-19 RX ADMIN — Medication 10 ML: at 09:03

## 2024-08-19 RX ADMIN — CLOPIDOGREL BISULFATE 75 MG: 75 TABLET ORAL at 09:03

## 2024-08-19 RX ADMIN — SODIUM CHLORIDE, PRESERVATIVE FREE 10 ML: 5 INJECTION INTRAVENOUS at 03:17

## 2024-08-19 RX ADMIN — APIXABAN 5 MG: 5 TABLET, FILM COATED ORAL at 09:04

## 2024-08-19 RX ADMIN — WATER 40 MG: 1 INJECTION INTRAMUSCULAR; INTRAVENOUS; SUBCUTANEOUS at 09:04

## 2024-08-19 RX ADMIN — TRAMADOL HYDROCHLORIDE 50 MG: 50 TABLET ORAL at 16:53

## 2024-08-19 RX ADMIN — SODIUM CHLORIDE: 9 INJECTION, SOLUTION INTRAVENOUS at 03:19

## 2024-08-19 RX ADMIN — ALBUTEROL SULFATE 2.5 MG: 2.5 SOLUTION RESPIRATORY (INHALATION) at 20:54

## 2024-08-19 RX ADMIN — WATER 60 MG: 1 INJECTION INTRAMUSCULAR; INTRAVENOUS; SUBCUTANEOUS at 03:15

## 2024-08-19 RX ADMIN — TAMSULOSIN HYDROCHLORIDE 0.4 MG: 0.4 CAPSULE ORAL at 09:03

## 2024-08-19 RX ADMIN — POTASSIUM PHOSPHATE, MONOBASIC POTASSIUM PHOSPHATE, DIBASIC 15 MMOL: 224; 236 INJECTION, SOLUTION, CONCENTRATE INTRAVENOUS at 02:05

## 2024-08-19 RX ADMIN — MAGNESIUM SULFATE HEPTAHYDRATE 2000 MG: 40 INJECTION, SOLUTION INTRAVENOUS at 03:21

## 2024-08-19 RX ADMIN — ACETAMINOPHEN 650 MG: 325 TABLET ORAL at 11:40

## 2024-08-19 RX ADMIN — GABAPENTIN 300 MG: 300 CAPSULE ORAL at 20:15

## 2024-08-19 RX ADMIN — DAPTOMYCIN 550 MG: 500 INJECTION, POWDER, LYOPHILIZED, FOR SOLUTION INTRAVENOUS at 16:57

## 2024-08-19 RX ADMIN — INSULIN LISPRO 4 UNITS: 100 INJECTION, SOLUTION INTRAVENOUS; SUBCUTANEOUS at 20:59

## 2024-08-19 RX ADMIN — INSULIN LISPRO 10 UNITS: 100 INJECTION, SOLUTION INTRAVENOUS; SUBCUTANEOUS at 20:58

## 2024-08-19 RX ADMIN — INSULIN GLARGINE 60 UNITS: 100 INJECTION, SOLUTION SUBCUTANEOUS at 03:30

## 2024-08-19 RX ADMIN — APIXABAN 5 MG: 5 TABLET, FILM COATED ORAL at 20:15

## 2024-08-19 RX ADMIN — INSULIN GLARGINE 60 UNITS: 100 INJECTION, SOLUTION SUBCUTANEOUS at 20:39

## 2024-08-19 RX ADMIN — CEFEPIME 2000 MG: 2 INJECTION, POWDER, FOR SOLUTION INTRAVENOUS at 20:31

## 2024-08-19 RX ADMIN — GABAPENTIN 300 MG: 300 CAPSULE ORAL at 16:53

## 2024-08-19 RX ADMIN — CEFEPIME 2000 MG: 2 INJECTION, POWDER, FOR SOLUTION INTRAVENOUS at 09:09

## 2024-08-19 RX ADMIN — Medication 10 ML: at 20:15

## 2024-08-19 RX ADMIN — BUDESONIDE AND FORMOTEROL FUMARATE DIHYDRATE 2 PUFF: 160; 4.5 AEROSOL RESPIRATORY (INHALATION) at 20:54

## 2024-08-19 RX ADMIN — ACETAMINOPHEN 650 MG: 325 TABLET ORAL at 20:15

## 2024-08-19 RX ADMIN — WATER 40 MG: 1 INJECTION INTRAMUSCULAR; INTRAVENOUS; SUBCUTANEOUS at 16:54

## 2024-08-19 RX ADMIN — ATORVASTATIN CALCIUM 20 MG: 20 TABLET, FILM COATED ORAL at 09:03

## 2024-08-19 ASSESSMENT — PAIN DESCRIPTION - DESCRIPTORS
DESCRIPTORS: SHARP
DESCRIPTORS: DISCOMFORT
DESCRIPTORS: ACHING;CRUSHING;DISCOMFORT
DESCRIPTORS: DISCOMFORT

## 2024-08-19 ASSESSMENT — PAIN DESCRIPTION - LOCATION
LOCATION: ANKLE
LOCATION: LEG
LOCATION: FOOT

## 2024-08-19 ASSESSMENT — PAIN DESCRIPTION - ORIENTATION
ORIENTATION: LEFT
ORIENTATION: LEFT;RIGHT
ORIENTATION: RIGHT

## 2024-08-19 ASSESSMENT — PAIN SCALES - GENERAL
PAINLEVEL_OUTOF10: 9
PAINLEVEL_OUTOF10: 8
PAINLEVEL_OUTOF10: 3
PAINLEVEL_OUTOF10: 4
PAINLEVEL_OUTOF10: 7
PAINLEVEL_OUTOF10: 8

## 2024-08-19 ASSESSMENT — PAIN DESCRIPTION - PAIN TYPE: TYPE: ACUTE PAIN

## 2024-08-19 ASSESSMENT — PAIN DESCRIPTION - FREQUENCY: FREQUENCY: CONTINUOUS

## 2024-08-19 NOTE — ED NOTES
How does patient ambulate?   []Low Fall Risk (ambulates by themselves without support)  []Stand by assist   [x]Contact Guard   []Front wheel walker  []Wheelchair   []Steady  []Bed bound  []History of Lower Extremity Amputation  []Unknown, did not assess in the emergency department   How does patient take pills?  [x]Whole with Water  []Crushed in applesauce  []Crushed in pudding  []Other  []Unknown no oral medications were given in the ED  Is patient alert?   []Alert  [x]Drowsy but responds to voice  []Doesn't respond to voice but responds to painful stimuli  []Unresponsive  Is patient oriented?   [x]To person  []To place  []To time  []To situation  [x]Confused  []Agitated  [x]Follows commands  If patient is disoriented or from a Skill Nursing Facility has family been notified of admission?   []Yes   []No  Patient belongings?   [x]Cell phone  []Wallet   []Dentures  []Clothing  Any specific patient or family belongings/needs/dynamics?     Miscellaneous comments/pending orders?       If there are any additional questions please reach out to the Emergency Department.

## 2024-08-19 NOTE — CONSULTS
Infectious Diseases   Consult Note        Admission Date: 8/18/2024  Hospital Day: Hospital Day: 2   Attending: Alberto Ng MD  Date of service: 8/19/24     Reason for admission: Lactic acidosis [E87.20]  Septicemia (HCC) [A41.9]  Cellulitis of left leg [L03.116]  Sepsis due to cellulitis (HCC) [L03.90, A41.9]    Chief complaint/ Reason for consult:   Sepsis, left leg cellulitis, complicated left foot wound    Microbiology:        I have reviewed allavailable micro lab data and cultures    Blood culture (2/2) - collected on 8/18/2024: In process        Antibiotics and immunizations:       Current antibiotics: All antibiotics and their doses were reviewed by me    Recent Abx Admin                     ceFEPIme (MAXIPIME) 2,000 mg in sodium chloride 0.9 % 100 mL IVPB (mini-bag) (mg) 2,000 mg New Bag 08/19/24 0909    vancomycin (VANCOCIN) 2,500 mg in sodium chloride 0.9 % 500 mL IVPB (mg) 2,500 mg New Bag 08/18/24 2137    ceFEPIme (MAXIPIME) 2,000 mg in sodium chloride 0.9 % 100 mL IVPB (mini-bag) (mg) 2,000 mg New Bag 08/18/24 2057                      Immunization History: All immunization history was reviewed by me today.      There is no immunization history on file for this patient.    Known drug allergies:     All allergies were reviewed and updated    Allergies   Allergen Reactions    Dilaudid [Hydromorphone Hcl]      Violent     Hydrochlorothiazide Itching       Social history:     Social History:  All social andepidemiologic history was reviewed and updated by me today as needed.     Tobacco use:   reports that he has been smoking cigarettes. He has never used smokeless tobacco.  Alcohol use:   reports no history of alcohol use.  Currently lives in: [place] [id number]   reports no history of drug use.     COVID VACCINATION AND LAB RESULT RECORDS:     Internal Administration   First Dose      Second Dose           Last COVID Lab SARS-CoV-2 RNA, RT PCR (no units)   Date Value   08/19/2024 NOT  DETECTED            Assessment:     The patient is a 73 y.o. old male who  has a past medical history of COPD (chronic obstructive pulmonary disease) (HCC), Diabetes mellitus (HCC), Hyperlipidemia, and Hypertension. with following problems:    Sepsis with fever of 100.8, leukocytosis, tachycardia and tachypnea  Left lower extremity cellulitis  Failure of outpatient antibiotics  Acute kidney injury  Elevated procalcitonin of 2.24  Elevated CRP of 185  Elevated proBNP of greater than 3600  Elevated sed rate of 62  Essential hypertension  COPD  Mixed hyperlipidemia  Obesity Class 2 due to excess calorie intake : Body mass index is 39.53 kg/m².        Discussion:      The patient was hospitalized for sepsis syndrome.  2 sets of blood cultures have been sent out yesterday.  They are in process.    The patient had a rapid COVID-19 and influenza PCR test done earlier today.  It was negative.    The patient has a serum creatinine of 1.8 today.    Sed rate and CRP are significantly elevated.    White cell count is 14,800 today with hemoglobin of 10.3 and platelet count of 210,000.    I reviewed the images of the patient's chest x-ray, CT scan of the head and CT angiogram scan of the chest done with IV contrast yesterday independently interpreted the imaging studies.  No acute intracranial abnormalities or pulmonary embolism on the CT scans.  Chronic emphysematous changes noted.      Plan:     Diagnostic Workup:    Agree with blood cultures  Follow-up MRI of the left foot  Will order left foot wound culture  Will order baseline CK level exam started the patient on daptomycin  Continue to follow fever curve, WBC count and blood cultures  Follow up on liverand renal functions closely    Antimicrobials:    I stop IV vancomycin today in the setting of ongoing kidney issues  Will order IV daptomycin 550 mg every 24 hourly gram-positive coverage  Will continue IV cefepime at a renal dose of 2 g every 12 hour for empiric  friction rub.   Pulmonary:      Effort: No respiratory distress.      Breath sounds: No stridor. No wheezing or rales.   Abdominal:      General: Bowel sounds are normal.      Palpations: Abdomen is soft.      Tenderness: There is no abdominal tenderness. There is no guarding or rebound.   Musculoskeletal:         General: Swelling and tenderness present. No deformity. Normal range of motion.      Cervical back: Normal range of motion and neck supple.      Right lower leg: No edema.      Left lower leg: No edema.   Lymphadenopathy:      Cervical: No cervical adenopathy.   Skin:     General: Skin is warm and dry.      Coloration: Skin is not jaundiced.      Findings: Erythema present. No bruising or rash.      Comments: Left leg cellulitis with left foot wound noted   Neurological:      General: No focal deficit present.      Mental Status: He is alert and oriented to person, place, and time. Mental status is at baseline.      Motor: No abnormal muscle tone.   Psychiatric:         Mood and Affect: Mood normal.         Behavior: Behavior normal.           Lines and drains: All vascular access sites are healthy with no local erythema, discharge or tenderness.      Intake and output:     I/O last 3 completed shifts:  In: 653.5 [I.V.:42.5; IV Piggyback:611]  Out: -     Lab Data:   All available labs were reviewed by me today.     CBC:   Recent Labs     08/18/24 2037 08/19/24  0443   WBC 17.8* 14.8*   RBC 4.65 4.14*   HGB 11.3* 10.3*   HCT 35.3* 31.5*    210   MCV 75.9* 75.9*   MCH 24.2* 24.8*   MCHC 31.9 32.7   RDW 17.3* 17.7*        BMP:  Recent Labs     08/18/24 2037 08/19/24  0443   * 135*   K 3.8 3.7    103   CO2 18* 20*   BUN 25* 20   CREATININE 2.2* 1.8*   CALCIUM 8.7 8.1*   GLUCOSE 291* 186*        Hepatic FunctionPanel:   Lab Results   Component Value Date/Time    ALKPHOS 73 08/18/2024 08:37 PM    ALT 7 08/18/2024 08:37 PM    AST 11 08/18/2024 08:37 PM    BILITOT 0.4 08/18/2024 08:37 PM

## 2024-08-19 NOTE — PROGRESS NOTES
Hospitalist Progress Note      PCP: No primary care provider on file.    Date of Admission: 8/18/2024    LOS: 1    Chief Complaint:   Chief Complaint   Patient presents with    Fatigue     Patient arrives from home via [facility]  with complaints of weakness. FD was called earlier for lift assist. Patient reports that he didn't fall but he just couldn't get up. Patient reports having cellulitis to left leg and taking antibiotics. Has become increasingly weak. Hot to touch. Tachycardic en route. Patient denies any injuries from falling.        Case Summary:   73-year-old gentleman with history of COPD, type 2 diabetes, hypertension, VTE, CAD, obesity with sleep apnea on CPAP, hyperlipidemia, stage III CKD with baseline creatinine of 1.6-1.7 who [facility] lower [facility]ulitis which has progressively worsened with increased pain erythema and swelling.  Patient has had recurrent falls at home.  Presented with lethargy, fatigue and fever found to have sepsis due to left lower extremity cellulitis complicated by acute exacerbation of COPD with hypoxia, acute on chronic kidney disease, elevated lactic acid, elevated troponins, electrolyte abnormalities with hypomagnesemia and hypophosphatemia.        Active Hospital Problems    Diagnosis Date Noted    COPD exacerbation (HCC) [J44.1] 08/19/2024    Acute on chronic respiratory failure with hypoxia (HCC) [J96.21] 08/19/2024    Acute kidney injury superimposed on chronic kidney disease (HCC) [N17.9, N18.9] 08/19/2024    Elevated lactic acid level [R79.89] 08/19/2024    Elevated troponin [R79.89] 08/19/2024    Hypomagnesemia [E83.42] 08/19/2024    Hypophosphatemia [E83.39] 08/19/2024    Type 2 diabetes mellitus (HCC) [E11.9] 08/19/2024    Obesity with sleep apnea [G47.30, E66.9] 08/19/2024    CAD (coronary artery disease) [I25.10] 08/19/2024    Sepsis due to cellulitis (HCC) [L03.90, A41.9] 08/18/2024  clopidogrel  75 mg Oral Daily    insulin glargine  60 Units SubCUTAneous Nightly    tamsulosin  0.4 mg Oral Daily    sodium chloride flush  5-40 mL IntraVENous 2 times per day    methylPREDNISolone  40 mg IntraVENous Q8H    nicotine  1 patch TransDERmal Daily    albuterol  2.5 mg Nebulization BID RT    vancomycin (VANCOCIN) intermittent dosing (placeholder)   Other RX Placeholder    vancomycin  1,500 mg IntraVENous Q24H     PRN Meds: albuterol, labetalol, sodium chloride flush, sodium chloride, magnesium sulfate, ondansetron **OR** ondansetron, polyethylene glycol, acetaminophen **OR** acetaminophen, sodium phosphate 15 mmol in sodium chloride 0.9 % 250 mL IVPB, dextrose bolus **OR** dextrose bolus, glucagon (rDNA), dextrose      DVT Prophylaxis: On Eliquis  Diet: ADULT DIET; Regular; 4 carb choices (60 gm/meal)  Code Status: Full Code    Dispo: Anticipate discharge in 4 days pending clinical improvement    ____________________________________________________________________________    Subjective:   Overnight Events:   Uneventful overnight  Expresses pain to the left lower extremity        Physical Exam:  BP (!) 150/70   Pulse 96   Temp 99 °F (37.2 °C) (Axillary)   Resp 18   Ht 1.727 m (5' 8\")   Wt 117.9 kg (260 lb)   SpO2 93%   BMI 39.53 kg/m²   General appearance: No apparent distress, appears stated age and cooperative.  HEENT: Normocephalic, atraumatic, MMM, No sclera icterus/conjuctival palor  Neck: Supple, no thyromegally. No jugular venous distention.   Respiratory:  Normal respiratory effort. Clear to auscultation, no Rales/Wheezes/Rhonchi.  Cardiovascular: S1/S2 without murmurs, rubs or gallops. RRR  Abdomen: Soft, non-tender, non-distended, bowel sounds present.  Musculoskeletal: No clubbing, cyanosis or edema bilaterally.  Extensive erythema noted with significant swelling of the left foot.    Skin: Left lower extremity erythema with cellulitis  Neurologic:  Cranial nerves: II-XII intact, FAZAL, No

## 2024-08-19 NOTE — CARE COORDINATION
Chart reviewed at this time. States from home. CM will verify address as he has a [place] address listed.    On IV abx Cefepime and Vancomycin.    Therapy evaluations are pending.    CM will follow for discharge plan and needs.    Clari See RN, BSN  [phone]

## 2024-08-19 NOTE — H&P
V2.0  History and Physical      Name:  Gamal Blue /Age/Sex: 1950  (73 y.o. male)   MRN & CSN:  [id number] & [id number] Encounter Date/Time: 2024 11:49 PM EDT   Location:  - PCP: No primary care provider on file.       Hospital Day: 1    Assessment and Plan:   Gamal Blue is a 73 y.o. male with a pmh of COPD, DM2, obstructive sleep apnea on CPAP at at bedtime, hyperlipidemia, hypertension, nicotine dependence, DVT on Eliquis, CAD.  [facility].  He presents for evaluation of recurrent falls, fatigue/lethargy.  He met sepsis criteria with source as left leg cellulitis.  Antibiotics started.  He is also hypoxic on ED presentation requiring nonrebreather.  He is pending VBG.    Hospital Problems             Last Modified POA    * (Principal) Sepsis due to cellulitis ([facility]) 2024 Yes       Plan:  # Sepsis due to left leg cellulitis.  # Left leg cellulitis-failed [facility]  # Lactic acidosis.  -Admit to stepdown telemetry.  -Continue Vanco and cefepime pending cultures.  -Consult wound care  -Trend lactic.      # ?  Multifocal pneumonia versus pulmonary edema-Per chest x-ray  # COPD with acute exacerbation.-Emphysema per chest x-ray.  # Acute respiratory failure with hypoxia likely due to above.  # Obstructive sleep apnea-with CPAP at home.  -He required nonrebreather on presentation.  Currently on 5 L O2 via nasal cannula.  No O2 use baseline.  -No acute PE per CT chest.  -Pending stat VBG.  -Stat CPAP/BiPAP pending gases.  -Steroids, resume home inhalers.  -Continue antibiotics as above.  -Check viral panel.  -He is at high risk for intubation.    # Recurrent falls.   # Lethargy/generalized weakness.  -CT head without acute findings.  -Normal ammonia.  - initial encounter.  He reports falls due to unsteady gait.  Consult PT OT continue fall precautions.    # Lactic acidosis.  Likely due to above.  the mA/kV was utilized to reduce the radiation dose to as low as reasonably achievable. COMPARISON: Correlation with concurrent chest radiograph HISTORY: Acute weakness, fatigue, tachycardia, and hypoxia. FINDINGS: Image quality degraded by motion artifact. Pulmonary Arteries: Pulmonary arteries are adequately opacified for evaluation.  No intraluminal filling defect seen.  Main pulmonary artery is normal in caliber. Mediastinum: Heart is not enlarged. Systemic and coronary atherosclerotic calcification.  No pericardial effusion.  Thoracic aorta is normal caliber. No lymphadenopathy.  Thyroid and esophagus are unremarkable. Lungs/pleura: Upper lobe predominant emphysema.  Dependent atelectasis.  No consolidation, pleural effusion, or pneumothorax.. Upper Abdomen: Cholecystectomy. Soft Tissues/Bones: Mild spondylotic changes of the thoracic spine.     No pulmonary embolism.  Emphysema without acute pulmonary abnormality.     CT HEAD WO CONTRAST    Result Date: 8/18/2024  EXAMINATION: CT OF THE HEAD WITHOUT CONTRAST  8/18/2024 9:54 pm TECHNIQUE: CT of the head was performed without the administration of intravenous contrast. Automated exposure control, iterative reconstruction, and/or weight based adjustment of the mA/kV was utilized to reduce the radiation dose to as low as reasonably achievable. COMPARISON: None. HISTORY: ORDERING SYSTEM PROVIDED HISTORY: Falls TECHNOLOGIST PROVIDED HISTORY: Reason for exam:->Falls Has a \"code stroke\" or \"stroke alert\" been called?->No Decision Support Exception - unselect if not a suspected or confirmed emergency medical condition->Emergency Medical Condition (MA) Reason for Exam: falls Relevant Medical/Surgical History: Fatigue (Patient arrives from home via [facility] with complaints of weakness. FD was called earlier for lift assist. Patient reports that he didn't fall but he just couldn't get up. Patient reports having cellulitis to left leg and taking antibiotics. Has

## 2024-08-19 NOTE — PROGRESS NOTES
Clinical Pharmacy Note: Pharmacy to Dose Vancomycin    Gamal Blue is a 73 y.o. male started on Vancomycin for SSTI; consult received from Arlene Lima CNP. to manage therapy. Also receiving the following antibiotics: Cefepime.    Goal AUC: 400-600 mg/L*hr  Goal Trough Level: 10-15 mcg/mL    Assessment/Plan:  A 2500 mg loading dose was given on 8/18 at 21:00  Initiate vancomycin 1500 mg IV every 24 hours. Bayesian modeling predicts an AUC of 415 mg/L*hr and a trough of 17 mcg/mL at steady state concentration.  A vancomycin random level has been ordered for 8/20 at 0600.  Changes in regimen will be determined based on culture results, renal function, and clinical response.  Pharmacy will continue to monitor and adjust regimen as necessary.    Allergies:  Dilaudid [hydromorphone hcl] and Hydrochlorothiazide     Recent Labs     08/18/24 2037 08/19/24  0443   CREATININE 2.2* 1.8*       Recent Labs     08/18/24 2037 08/19/24  0443   WBC 17.8* 14.8*       Ht Readings from Last 1 Encounters:   08/18/24 1.727 m (5' 8\")        Wt Readings from Last 1 Encounters:   08/18/24 117.9 kg (260 lb)         Estimated Creatinine Clearance: 46 mL/min (A) (based on SCr of 1.8 mg/dL (H)).      Thank you for the consult,    Iva Flynn, PharmD  PGY-1 Pharmacy Resident

## 2024-08-19 NOTE — RT PROTOCOL NOTE
RT Inhaler-Nebulizer Bronchodilator Protocol Note    There is a bronchodilator order in the chart from a provider indicating to follow the RT Bronchodilator Protocol and there is an “Initiate RT Inhaler-Nebulizer Bronchodilator Protocol” order as well (see protocol at bottom of note).    CXR Findings:  XR CHEST PORTABLE    Result Date: 8/18/2024  Bilateral patchy interstitial opacities, concerning for pulmonary edema or multifocal pneumonia.       The findings from the last RT Protocol Assessment were as follows:   History Pulmonary Disease: Chronic pulmonary disease  Respiratory Pattern: Dyspnea on exertion or RR 21-25 bpm  Breath Sounds: Slightly diminished and/or crackles  Cough: Strong, spontaneous, non-productive  Indication for Bronchodilator Therapy: On home bronchodilators  Bronchodilator Assessment Score: 6    Aerosolized bronchodilator medication orders have been revised according to the RT Inhaler-Nebulizer Bronchodilator Protocol below.    Respiratory Therapist to perform RT Therapy Protocol Assessment initially then follow the protocol.  Repeat RT Therapy Protocol Assessment PRN for score 0-3 or on second treatment, BID, and PRN for scores above 3.    No Indications - adjust the frequency to every 6 hours PRN wheezing or bronchospasm, if no treatments needed after 48 hours then discontinue using Per Protocol order mode.     If indication present, adjust the RT bronchodilator orders based on the Bronchodilator Assessment Score as indicated below.  Use Inhaler orders unless patient has one or more of the following: on home nebulizer, not able to hold breath for 10 seconds, is not alert and oriented, cannot activate and use MDI correctly, or respiratory rate 25 breaths per minute or more, then use the equivalent nebulizer order(s) with same Frequency and PRN reasons based on the score.  If a patient is on this medication at home then do not decrease Frequency below that used at home.    0-3 - enter or  revise RT bronchodilator order(s) to equivalent RT Bronchodilator order with Frequency of every 4 hours PRN for wheezing or increased work of breathing using Per Protocol order mode.        4-6 - enter or revise RT Bronchodilator order(s) to two equivalent RT bronchodilator orders with one order with BID Frequency and one order with Frequency of every 4 hours PRN wheezing or increased work of breathing using Per Protocol order mode.        7-10 - enter or revise RT Bronchodilator order(s) to two equivalent RT bronchodilator orders with one order with TID Frequency and one order with Frequency of every 4 hours PRN wheezing or increased work of breathing using Per Protocol order mode.       11-13 - enter or revise RT Bronchodilator order(s) to one equivalent RT bronchodilator order with QID Frequency and an Albuterol order with Frequency of every 4 hours PRN wheezing or increased work of breathing using Per Protocol order mode.      Greater than 13 - enter or revise RT Bronchodilator order(s) to one equivalent RT bronchodilator order with every 4 hours Frequency and an Albuterol order with Frequency of every 2 hours PRN wheezing or increased work of breathing using Per Protocol order mode.     RT to enter RT Home Evaluation for COPD & MDI Assessment order using Per Protocol order mode.    Electronically signed by Ra Kapadia RCP on 8/19/2024 at 1:23 AM

## 2024-08-19 NOTE — PLAN OF CARE
Problem: Discharge Planning  Goal: Discharge to home or other facility with appropriate resources  Outcome: Progressing  Flowsheets (Taken 8/19/2024 0845)  Discharge to home or other facility with appropriate resources: Identify barriers to discharge with patient and caregiver     Problem: Pain  Goal: Verbalizes/displays adequate comfort level or baseline comfort level  Outcome: Progressing  Flowsheets (Taken 8/19/2024 0845)  Verbalizes/displays adequate comfort level or baseline comfort level: Encourage patient to monitor pain and request assistance     Problem: Safety - Adult  Goal: Free from fall injury  Outcome: Progressing     Problem: Skin/Tissue Integrity  Goal: Absence of new skin breakdown  Description: 1.  Monitor for areas of redness and/or skin breakdown  2.  Assess vascular access sites hourly  3.  Every 4-6 hours minimum:  Change oxygen saturation probe site  4.  Every 4-6 hours:  If on nasal continuous positive airway pressure, respiratory therapy assess nares and determine need for appliance change or resting period.  Outcome: Progressing     Problem: ABCDS Injury Assessment  Goal: Absence of physical injury  Outcome: Progressing

## 2024-08-19 NOTE — ED PROVIDER NOTES
EMERGENCY DEPARTMENT PROVIDER NOTE      PATIENT IDENTIFICATION  Name:   Gamal Blue  MRN:   [id number]  YOB: 1950  Date of Evaluation:   8/18/2024  Provider:   Tej Haque DO  PCP:   No primary care provider on file.        CHIEF COMPLAINT:   Fatigue (Patient arrives from home via [facility] with complaints of weakness. FD was called earlier for lift assist. Patient reports that he didn't fall but he just couldn't get up. Patient reports having cellulitis to left leg and taking antibiotics. Has become increasingly weak. Hot to touch. Tachycardic en route. Patient denies any injuries from falling. )      HPI  Gamal Blue is a(n) 73 y.o. male with past medical history as below   who arrives via EMS for recurrent falls.  Patient states that he was unable to get out of bed today due to generalized fatigue.  States that he has been on an oral antibiotic for cellulitis of the left lower extremity, but symptoms not improved affirms shortness of breath.  States that he does not wear oxygen at home.  He does affirm a history of a DVT that he used to take blood thinners for but states he does not take them anymore      I personally reviewed the following nurse documentation:  Past Medical History:     Past Medical History:   Diagnosis Date    COPD (chronic obstructive pulmonary disease) (HCC)     Diabetes mellitus (HCC)     Hyperlipidemia     Hypertension      Home Medications:     Previous Medications    ALBUTEROL (ACCUNEB) 0.63 MG/3ML NEBULIZER SOLUTION    Take 1 ampule by nebulization every 6 hours as needed for Wheezing    AMLODIPINE (NORVASC) 10 MG TABLET    Take 10 mg by mouth daily    APIXABAN (ELIQUIS) 5 MG TABS TABLET    Take by mouth 2 times daily    ATORVASTATIN (LIPITOR) 20 MG TABLET    Take 20 mg by mouth daily    BUDESONIDE-FORMOTEROL (SYMBICORT) 160-4.5 MCG/ACT AERO    Inhale 2 puffs into the lungs 2 times daily    CLOPIDOGREL (PLAVIX) 75 MG TABLET    Take 75 mg by mouth daily  intervention, and personal management.    The total critical care time personally spent while evaluating and treating this patient was 41 minutes exclusive of any time spent doing separately billable procedures.  This includes time at the bedside, data interpretation, medication management, monitoring for potential decompensation and physician consultation.  Specifics of interventions taken and potentially life-threatening diagnostic considerations are listed above in the medical decision making.    ED Course as of 08/18/24 2344   Sun Aug 18, 2024   2102 Pulse(!): 108 [PB]   2102 Respirations: 22  Septic workup ongoing. [PB]   2102 Ocygen saturation 88-91% on 5L nc.  Consistent with history of COPD [PB]   2135 Lactic Acid, Sepsis(!): 3.0 [PB]   2135 WBC(!): 17.8  Sepsis workup ongoing [PB]   2135 Creatinine(!): 2.2  Appears baseline. [PB]   2136 Troponin, High Sensitivity(!): 34  Likely secondary to increased cardiac effort. [PB]   2147 Oxygen saturation 91-9% on nonrebreather.  Will continue to monitor. [PB]   2151 Total CK: 139 [PB]   2205 CT CHEST PULMONARY EMBOLISM W CONTRAST  Radiology read pending.  No pulmonary embolism. [PB]   2205 Patient revisited bedside.  Remains in stable condition.  Still complaining of dyspnea.  Tachycardia has somewhat improved.  Blood pressure remained stable.  Oxygen saturation at % on nonrebreather. [PB]   2244 CT HEAD WO CONTRAST  Negative for acute findings. [PB]   2252 CT CHEST PULMONARY EMBOLISM W CONTRAST  Negative for fluid consolidation or pneumonia.  No PE.    Left lower extremity cellulitis likely source of sepsis.  Patient on appropriate antibiotics.  Plan for admission.  Septic source [PB]   2254 Patient revisited bedside.  Joce in stable condition.  Long discussion regarding results and need for admission to the hospital for IV antibiotics.  Patient verbalized understanding and agreement with plan for admission.    He does states that this is usually about the

## 2024-08-19 NOTE — PROGRESS NOTES
Shift assessment completed. Routine vitals stable. Scheduled medications given. Patient is awake, alert and oriented. Respirations are easy and unlabored. Patient does not appear to be in distress, resting comfortably at this time. Call light within reach. Patient wore PAP last night but doesn't like the mask, encouraged that patient can bring his from home but he says he has no one to bring it. Patients wife is in a nursing facility due to a fall with fx. Patient does have no one else to help him at home. Patient will use a scooter at home or when he is out, cane or walker. Patient has missing toes on the right foot and the skin is very dry and cracking on the heel.   Dr Ng in to see patient, MRI order for left foot. Left foot/leg swollen and redness with dry flaky cracking skin. Wound consult in. Questionnaire completed and faxed to MRI.

## 2024-08-19 NOTE — CARE COORDINATION
Case Management Assessment  Initial Evaluation    Date/Time of Evaluation: 8/19/2024 2:04 PM   Assessment Completed by: MILLIE NAVA RN    If patient is discharged prior to next notation, then this note serves as note for discharge by case management.    Patient Name: Gamal Blue                   YOB: 1950  Diagnosis: Lactic acidosis [E87.20]  Septicemia (HCC) [A41.9]  Cellulitis of left leg [L03.116]  Sepsis due to cellulitis (HCC) [L03.90, A41.9]                   Date / Time: 8/18/2024  8:18 PM    Patient Admission Status: Inpatient   Readmission Risk (Low < 19, Mod (19-27), High > 27): Readmission Risk Score: 16.9    Current PCP: Clinton Kerns MD  PCP verified by CM? Yes    Chart Reviewed: Yes      History Provided by: Patient  Patient Orientation: Alert and Oriented    Patient Cognition: Alert    Hospitalization in the last 30 days (Readmission):  No    If yes, Readmission Assessment in  Navigator will be completed.    Advance Directives:      Code Status: Full Code   Patient's Primary Decision Maker is: Legal Next of Kin      Discharge Planning:    Patient lives with: Spouse/Significant Other, Other (Comment) (spouse in rehab at [facility]) Type of Home: House  Primary Care Giver: Self  Patient Support Systems include: Spouse/Significant Other   Current Financial resources: [facility],  ([facility])  Current community resources: None  Current services prior to admission: Home Bipap, Durable Medical Equipment            Current DME: Walker, Cane, Other (Comment) (Scooter, grab bars)            Type of Home Care services:  None    ADLS  Prior functional level: Independent in ADLs/IADLs  Current functional level: Other (see comment), Assistance with the following: (Therapy pending)    PT AM-PAC:   /24  OT AM-PAC:   /24    Family can provide assistance at DC: No (Spouse in rehab at [facility] at this time)  Would you like Case Management to discuss the discharge plan with any

## 2024-08-20 LAB
ALBUMIN SERPL-MCNC: 2.8 G/DL (ref 3.4–5)
ALBUMIN/GLOB SERPL: 0.7 {RATIO} (ref 1.1–2.2)
ALP SERPL-CCNC: 62 U/L (ref 40–129)
ALT SERPL-CCNC: 12 U/L (ref 10–40)
ANION GAP SERPL CALCULATED.3IONS-SCNC: 12 MMOL/L (ref 3–16)
AST SERPL-CCNC: 16 U/L (ref 15–37)
BASOPHILS # BLD: 0 K/UL (ref 0–0.2)
BASOPHILS NFR BLD: 0 %
BILIRUB SERPL-MCNC: <0.2 MG/DL (ref 0–1)
BUN SERPL-MCNC: 27 MG/DL (ref 7–20)
CALCIUM SERPL-MCNC: 8 MG/DL (ref 8.3–10.6)
CHLORIDE SERPL-SCNC: 97 MMOL/L (ref 99–110)
CO2 SERPL-SCNC: 18 MMOL/L (ref 21–32)
CREAT SERPL-MCNC: 1.4 MG/DL (ref 0.8–1.3)
DEPRECATED RDW RBC AUTO: 17.2 % (ref 12.4–15.4)
EOSINOPHIL # BLD: 0 K/UL (ref 0–0.6)
EOSINOPHIL NFR BLD: 0 %
GFR SERPLBLD CREATININE-BSD FMLA CKD-EPI: 53 ML/MIN/{1.73_M2}
GLUCOSE BLD-MCNC: 365 MG/DL (ref 70–99)
GLUCOSE BLD-MCNC: 391 MG/DL (ref 70–99)
GLUCOSE BLD-MCNC: 423 MG/DL (ref 70–99)
GLUCOSE BLD-MCNC: 434 MG/DL (ref 70–99)
GLUCOSE BLD-MCNC: 449 MG/DL (ref 70–99)
GLUCOSE BLD-MCNC: 478 MG/DL (ref 70–99)
GLUCOSE SERPL-MCNC: 396 MG/DL (ref 70–99)
HCT VFR BLD AUTO: 31.8 % (ref 40.5–52.5)
HGB BLD-MCNC: 10.1 G/DL (ref 13.5–17.5)
LYMPHOCYTES # BLD: 0.7 K/UL (ref 1–5.1)
LYMPHOCYTES NFR BLD: 5 %
MAGNESIUM SERPL-MCNC: 2.5 MG/DL (ref 1.8–2.4)
MCH RBC QN AUTO: 24.3 PG (ref 26–34)
MCHC RBC AUTO-ENTMCNC: 31.7 G/DL (ref 31–36)
MCV RBC AUTO: 76.6 FL (ref 80–100)
MONOCYTES # BLD: 0.6 K/UL (ref 0–1.3)
MONOCYTES NFR BLD: 4.2 %
NEUTROPHILS # BLD: 13.4 K/UL (ref 1.7–7.7)
NEUTROPHILS NFR BLD: 90.8 %
PERFORMED ON: ABNORMAL
PLATELET # BLD AUTO: 203 K/UL (ref 135–450)
PMV BLD AUTO: 7.3 FL (ref 5–10.5)
POTASSIUM SERPL-SCNC: 4.3 MMOL/L (ref 3.5–5.1)
PROT SERPL-MCNC: 6.9 G/DL (ref 6.4–8.2)
RBC # BLD AUTO: 4.15 M/UL (ref 4.2–5.9)
SODIUM SERPL-SCNC: 127 MMOL/L (ref 136–145)
WBC # BLD AUTO: 14.8 K/UL (ref 4–11)

## 2024-08-20 PROCEDURE — 97161 PT EVAL LOW COMPLEX 20 MIN: CPT

## 2024-08-20 PROCEDURE — 6370000000 HC RX 637 (ALT 250 FOR IP): Performed by: NURSE PRACTITIONER

## 2024-08-20 PROCEDURE — 6360000002 HC RX W HCPCS: Performed by: STUDENT IN AN ORGANIZED HEALTH CARE EDUCATION/TRAINING PROGRAM

## 2024-08-20 PROCEDURE — 97166 OT EVAL MOD COMPLEX 45 MIN: CPT

## 2024-08-20 PROCEDURE — 80053 COMPREHEN METABOLIC PANEL: CPT

## 2024-08-20 PROCEDURE — 97530 THERAPEUTIC ACTIVITIES: CPT

## 2024-08-20 PROCEDURE — 6370000000 HC RX 637 (ALT 250 FOR IP): Performed by: STUDENT IN AN ORGANIZED HEALTH CARE EDUCATION/TRAINING PROGRAM

## 2024-08-20 PROCEDURE — 2580000003 HC RX 258: Performed by: NURSE PRACTITIONER

## 2024-08-20 PROCEDURE — 94640 AIRWAY INHALATION TREATMENT: CPT

## 2024-08-20 PROCEDURE — 2060000000 HC ICU INTERMEDIATE R&B

## 2024-08-20 PROCEDURE — 6360000002 HC RX W HCPCS: Performed by: NURSE PRACTITIONER

## 2024-08-20 PROCEDURE — 36415 COLL VENOUS BLD VENIPUNCTURE: CPT

## 2024-08-20 PROCEDURE — 97535 SELF CARE MNGMENT TRAINING: CPT

## 2024-08-20 PROCEDURE — 94761 N-INVAS EAR/PLS OXIMETRY MLT: CPT

## 2024-08-20 PROCEDURE — 85025 COMPLETE CBC W/AUTO DIFF WBC: CPT

## 2024-08-20 PROCEDURE — 99233 SBSQ HOSP IP/OBS HIGH 50: CPT | Performed by: INTERNAL MEDICINE

## 2024-08-20 PROCEDURE — 2700000000 HC OXYGEN THERAPY PER DAY

## 2024-08-20 PROCEDURE — 83735 ASSAY OF MAGNESIUM: CPT

## 2024-08-20 PROCEDURE — 2580000003 HC RX 258: Performed by: INTERNAL MEDICINE

## 2024-08-20 PROCEDURE — 6360000002 HC RX W HCPCS: Performed by: INTERNAL MEDICINE

## 2024-08-20 PROCEDURE — 6370000000 HC RX 637 (ALT 250 FOR IP): Performed by: INTERNAL MEDICINE

## 2024-08-20 PROCEDURE — 94660 CPAP INITIATION&MGMT: CPT

## 2024-08-20 RX ORDER — INSULIN LISPRO 100 [IU]/ML
5 INJECTION, SOLUTION INTRAVENOUS; SUBCUTANEOUS
Status: DISCONTINUED | OUTPATIENT
Start: 2024-08-20 | End: 2024-08-21

## 2024-08-20 RX ORDER — INSULIN LISPRO 100 [IU]/ML
0-16 INJECTION, SOLUTION INTRAVENOUS; SUBCUTANEOUS
Status: DISCONTINUED | OUTPATIENT
Start: 2024-08-20 | End: 2024-08-27 | Stop reason: HOSPADM

## 2024-08-20 RX ORDER — ALBUTEROL SULFATE 0.83 MG/ML
2.5 SOLUTION RESPIRATORY (INHALATION)
Status: DISCONTINUED | OUTPATIENT
Start: 2024-08-20 | End: 2024-08-25

## 2024-08-20 RX ORDER — MUPIROCIN 20 MG/G
OINTMENT TOPICAL DAILY
Status: DISCONTINUED | OUTPATIENT
Start: 2024-08-20 | End: 2024-08-27 | Stop reason: HOSPADM

## 2024-08-20 RX ORDER — PANTOPRAZOLE SODIUM 40 MG/10ML
40 INJECTION, POWDER, LYOPHILIZED, FOR SOLUTION INTRAVENOUS DAILY
Status: DISCONTINUED | OUTPATIENT
Start: 2024-08-20 | End: 2024-08-27 | Stop reason: HOSPADM

## 2024-08-20 RX ORDER — LACTOBACILLUS RHAMNOSUS GG 10B CELL
1 CAPSULE ORAL 2 TIMES DAILY WITH MEALS
Status: DISCONTINUED | OUTPATIENT
Start: 2024-08-20 | End: 2024-08-27 | Stop reason: HOSPADM

## 2024-08-20 RX ORDER — INSULIN LISPRO 100 [IU]/ML
0-4 INJECTION, SOLUTION INTRAVENOUS; SUBCUTANEOUS NIGHTLY
Status: DISCONTINUED | OUTPATIENT
Start: 2024-08-20 | End: 2024-08-27 | Stop reason: HOSPADM

## 2024-08-20 RX ORDER — INSULIN LISPRO 100 [IU]/ML
10 INJECTION, SOLUTION INTRAVENOUS; SUBCUTANEOUS ONCE
Status: COMPLETED | OUTPATIENT
Start: 2024-08-20 | End: 2024-08-20

## 2024-08-20 RX ORDER — INSULIN LISPRO 100 [IU]/ML
10 INJECTION, SOLUTION INTRAVENOUS; SUBCUTANEOUS ONCE
Status: DISCONTINUED | OUTPATIENT
Start: 2024-08-20 | End: 2024-08-21

## 2024-08-20 RX ADMIN — DAPTOMYCIN 550 MG: 500 INJECTION, POWDER, LYOPHILIZED, FOR SOLUTION INTRAVENOUS at 17:09

## 2024-08-20 RX ADMIN — CEFEPIME 2000 MG: 2 INJECTION, POWDER, FOR SOLUTION INTRAVENOUS at 08:56

## 2024-08-20 RX ADMIN — PANTOPRAZOLE SODIUM 40 MG: 40 INJECTION, POWDER, FOR SOLUTION INTRAVENOUS at 08:45

## 2024-08-20 RX ADMIN — TRAMADOL HYDROCHLORIDE 50 MG: 50 TABLET ORAL at 20:44

## 2024-08-20 RX ADMIN — TAMSULOSIN HYDROCHLORIDE 0.4 MG: 0.4 CAPSULE ORAL at 08:44

## 2024-08-20 RX ADMIN — APIXABAN 5 MG: 5 TABLET, FILM COATED ORAL at 08:43

## 2024-08-20 RX ADMIN — TRAMADOL HYDROCHLORIDE 50 MG: 50 TABLET ORAL at 08:43

## 2024-08-20 RX ADMIN — WATER 40 MG: 1 INJECTION INTRAMUSCULAR; INTRAVENOUS; SUBCUTANEOUS at 17:02

## 2024-08-20 RX ADMIN — INSULIN LISPRO 16 UNITS: 100 INJECTION, SOLUTION INTRAVENOUS; SUBCUTANEOUS at 17:02

## 2024-08-20 RX ADMIN — INSULIN LISPRO 4 UNITS: 100 INJECTION, SOLUTION INTRAVENOUS; SUBCUTANEOUS at 20:45

## 2024-08-20 RX ADMIN — ALBUTEROL SULFATE 2.5 MG: 2.5 SOLUTION RESPIRATORY (INHALATION) at 07:55

## 2024-08-20 RX ADMIN — Medication 10 ML: at 20:45

## 2024-08-20 RX ADMIN — ALBUTEROL SULFATE 2.5 MG: 2.5 SOLUTION RESPIRATORY (INHALATION) at 23:34

## 2024-08-20 RX ADMIN — Medication 10 ML: at 08:45

## 2024-08-20 RX ADMIN — INSULIN LISPRO 16 UNITS: 100 INJECTION, SOLUTION INTRAVENOUS; SUBCUTANEOUS at 12:56

## 2024-08-20 RX ADMIN — APIXABAN 5 MG: 5 TABLET, FILM COATED ORAL at 20:45

## 2024-08-20 RX ADMIN — TRAMADOL HYDROCHLORIDE 50 MG: 50 TABLET ORAL at 00:28

## 2024-08-20 RX ADMIN — CLOPIDOGREL BISULFATE 75 MG: 75 TABLET ORAL at 08:44

## 2024-08-20 RX ADMIN — INSULIN LISPRO 5 UNITS: 100 INJECTION, SOLUTION INTRAVENOUS; SUBCUTANEOUS at 17:03

## 2024-08-20 RX ADMIN — GABAPENTIN 300 MG: 300 CAPSULE ORAL at 16:06

## 2024-08-20 RX ADMIN — INSULIN HUMAN 5 UNITS: 100 INJECTION, SOLUTION PARENTERAL at 16:06

## 2024-08-20 RX ADMIN — SODIUM CHLORIDE 25 ML: 9 INJECTION, SOLUTION INTRAVENOUS at 17:08

## 2024-08-20 RX ADMIN — INSULIN GLARGINE 60 UNITS: 100 INJECTION, SOLUTION SUBCUTANEOUS at 20:45

## 2024-08-20 RX ADMIN — CEFEPIME 2000 MG: 2 INJECTION, POWDER, FOR SOLUTION INTRAVENOUS at 20:53

## 2024-08-20 RX ADMIN — WATER 40 MG: 1 INJECTION INTRAMUSCULAR; INTRAVENOUS; SUBCUTANEOUS at 09:01

## 2024-08-20 RX ADMIN — WATER 40 MG: 1 INJECTION INTRAMUSCULAR; INTRAVENOUS; SUBCUTANEOUS at 00:28

## 2024-08-20 RX ADMIN — INSULIN LISPRO 10 UNITS: 100 INJECTION, SOLUTION INTRAVENOUS; SUBCUTANEOUS at 06:09

## 2024-08-20 RX ADMIN — SODIUM CHLORIDE, PRESERVATIVE FREE 10 ML: 5 INJECTION INTRAVENOUS at 00:29

## 2024-08-20 RX ADMIN — BUDESONIDE AND FORMOTEROL FUMARATE DIHYDRATE 2 PUFF: 160; 4.5 AEROSOL RESPIRATORY (INHALATION) at 07:55

## 2024-08-20 RX ADMIN — Medication 1 CAPSULE: at 17:02

## 2024-08-20 RX ADMIN — Medication 1 CAPSULE: at 08:44

## 2024-08-20 RX ADMIN — INSULIN LISPRO 16 UNITS: 100 INJECTION, SOLUTION INTRAVENOUS; SUBCUTANEOUS at 08:45

## 2024-08-20 RX ADMIN — INSULIN LISPRO 5 UNITS: 100 INJECTION, SOLUTION INTRAVENOUS; SUBCUTANEOUS at 08:44

## 2024-08-20 RX ADMIN — INSULIN LISPRO 5 UNITS: 100 INJECTION, SOLUTION INTRAVENOUS; SUBCUTANEOUS at 12:57

## 2024-08-20 RX ADMIN — WATER 40 MG: 1 INJECTION INTRAMUSCULAR; INTRAVENOUS; SUBCUTANEOUS at 23:17

## 2024-08-20 RX ADMIN — SODIUM CHLORIDE 25 ML: 9 INJECTION, SOLUTION INTRAVENOUS at 08:53

## 2024-08-20 RX ADMIN — GABAPENTIN 300 MG: 300 CAPSULE ORAL at 08:43

## 2024-08-20 RX ADMIN — ALBUTEROL SULFATE 2.5 MG: 2.5 SOLUTION RESPIRATORY (INHALATION) at 15:49

## 2024-08-20 RX ADMIN — GABAPENTIN 300 MG: 300 CAPSULE ORAL at 20:44

## 2024-08-20 ASSESSMENT — PAIN DESCRIPTION - ONSET: ONSET: ON-GOING

## 2024-08-20 ASSESSMENT — PAIN DESCRIPTION - ORIENTATION
ORIENTATION: RIGHT;LEFT
ORIENTATION: LEFT

## 2024-08-20 ASSESSMENT — PAIN SCALES - GENERAL
PAINLEVEL_OUTOF10: 9
PAINLEVEL_OUTOF10: 9
PAINLEVEL_OUTOF10: 8

## 2024-08-20 ASSESSMENT — PAIN DESCRIPTION - FREQUENCY
FREQUENCY: CONTINUOUS
FREQUENCY: CONTINUOUS

## 2024-08-20 ASSESSMENT — PAIN - FUNCTIONAL ASSESSMENT: PAIN_FUNCTIONAL_ASSESSMENT: PREVENTS OR INTERFERES SOME ACTIVE ACTIVITIES AND ADLS

## 2024-08-20 ASSESSMENT — PAIN DESCRIPTION - DESCRIPTORS
DESCRIPTORS: ACHING;DISCOMFORT
DESCRIPTORS: DISCOMFORT;ACHING

## 2024-08-20 ASSESSMENT — PAIN DESCRIPTION - LOCATION
LOCATION: FOOT
LOCATION: FOOT;BACK

## 2024-08-20 ASSESSMENT — PAIN DESCRIPTION - PAIN TYPE
TYPE: ACUTE PAIN
TYPE: ACUTE PAIN

## 2024-08-20 NOTE — PROGRESS NOTES
[facility] - Inpatient Rehabilitation Department   Phone: [phone]    Occupational Therapy    [x] Initial Evaluation            [] Daily Treatment Note         [] Discharge Summary      Patient: Gamal Blue   : 1950   MRN: [id number]   Date of Service:  2024    Admitting Diagnosis:  Sepsis due to cellulitis (HCC)  Current Admission Summary: Per H&P \"Gamal Blue is a(n) 73 y.o. male with past medical history as below   who arrives via EMS for recurrent falls.  Patient states that he was unable to get out of bed today due to generalized fatigue.  States that he has been on an oral antibiotic for cellulitis of the left lower extremity, but symptoms not improved affirms shortness of breath.  States that he does not wear oxygen at home.  He does affirm a history of a DVT that he used to take blood thinners for but states he does not take them anymore \"  Past Medical History:  has a past medical history of COPD (chronic obstructive pulmonary disease) (HCC), Diabetes mellitus (HCC), Hyperlipidemia, and Hypertension.  Past Surgical History:  has a past surgical history that includes Appendectomy.    Discharge Recommendations: Gamal Blue scored a 16/24 on the AM-PAC ADL Inpatient form. Current research shows that an AM-PAC score of 17 or less is typically not associated with a discharge to the patient's home setting. Based on the patient's AM-PAC score and their current ADL deficits, it is recommended that the patient have 3-5 sessions per week of Occupational Therapy at d/c to increase the patient's independence.  Please see assessment section for further patient specific details.    If patient discharges prior to next session this note will serve as a discharge summary.  Please see below for the latest assessment towards goals.      DME Required For Discharge: DME to be determined at next level of care    Precautions/Restrictions: high fall risk  Weight Bearing Restrictions: no

## 2024-08-20 NOTE — PROGRESS NOTES
[facility]  Hyperglycemia Event      NAME: Gamal Blue  MEDICAL RECORD NUMBER:  [id number]  AGE: 73 y.o.   GENDER: male  : 1950  EPISODE DATE:  2024     Data     Pt with hyperglycemia. Currently receiving IV solumedrol q8hr for COPD. Also has LLE cellulitis.    Recent Labs     24  1144 24  2005 24  0149 24  0608 24  0719 24  1148   POCGLU 347* 503* 434* 423* 449* 478*       Medications  Scheduled Medications:   insulin lispro  0-16 Units SubCUTAneous TID WC    insulin lispro  0-4 Units SubCUTAneous Nightly    insulin lispro  5 Units SubCUTAneous TID WC    pantoprazole  40 mg IntraVENous Daily    lactobacillus  1 capsule Oral BID WC    albuterol  2.5 mg Nebulization 4x Daily RT    insulin regular  5 Units IntraVENous Once    cefepime  2,000 mg IntraVENous Q12H    apixaban  5 mg Oral BID    [Held by provider] atorvastatin  20 mg Oral Daily    budesonide-formoterol  2 puff Inhalation BID    clopidogrel  75 mg Oral Daily    tamsulosin  0.4 mg Oral Daily    sodium chloride flush  5-40 mL IntraVENous 2 times per day    methylPREDNISolone  40 mg IntraVENous Q8H    nicotine  1 patch TransDERmal Daily    DAPTOmycin (CUBICIN) 550 mg in sodium chloride 0.9 % 50 mL IVPB  6 mg/kg (Adjusted) IntraVENous Q24H    gabapentin  300 mg Oral TID    insulin glargine  60 Units SubCUTAneous Nightly       Diet  Current diet/supplement order: ADULT DIET; Regular; 4 carb choices (60 gm/meal)     Recorded PO: PO Meals Eaten (%): 51 - 75% last meal in flowsheets      Action      Physician Notified of event: Yes       Consults (leeann all that apply):  []   Pharmacy   []   Dietitian    [x]   Diabetes Educator  []   Other (please describe)    Response     Medication plan updated: Yes, hospitalist titrating insulin    Continue with current medication plan:  Secure message sent to Dr. Tabor to consider restarting IVF if indicated    Electronically signed by Radha Varela RN

## 2024-08-20 NOTE — CARE COORDINATION
[facility] Nurse  Follow-up Progress Note       NAME:  Gamal Blue  MEDICAL RECORD NUMBER:  [id number]  AGE:  73 y.o.   GENDER:  male  :  1950  TODAY'S DATE:  2024    Subjective:   Wound Identification:  Wound Type:  cellulitis  Contributing Factors: diabetes and cellulitis         Patient Goal of Care:  [x] Wound Healing  [] Odor Control  [] Palliative Care  [] Pain Control   [] Other:     Objective:    /66   Pulse 66   Temp 97.1 °F (36.2 °C) (Axillary)   Resp 16   Ht 1.727 m (5' 8\")   Wt 120.9 kg (266 lb 9.6 oz)   SpO2 95%   BMI 40.54 kg/m²   Alvin Risk Score: Alvin Scale Score: 17  Assessment:   Measurements:   Patient seen for cellulitis to left leg/ foot.  Patient agreeable to visit.  Left leg is red and warm to touch, there is dry flaky skin on leg and foot. There is also a small fissure at the base of the left great toe near the 2nd toe. This is dry.  Also the right leg is swollen, but not red.  Noted is partial amputations of the right great and 2nd toe.  There is also dry flaky skin on the plantar of the right foot.  [facility]  Patient reports he does have compression socks but that he has difficulty putting them on.  Recommend cleansing both legs and feet with dial soap and water.  Apply dermaphor ointment to treat the dry skin.  Also paint the left toe wound with betadine. Wrap from midfoot to below with roll gauze and ace wrap, to bilateral legs.  Change daily.     Response to treatment:  Well tolerated by patient.     Pain Assessment:  Severity:  0 / 10  Quality of pain: N/A  Wound Pain Timing/Severity: none  Premedicated: No  Plan:   Plan of Care:  Recommend cleansing both legs and feet with dial soap and water.  Apply dermaphor ointment to treat the dry skin.  Also paint the left toe wound with betadine. Wrap from midfoot to below with roll gauze and ace wrap, to bilateral  legs.  Change daily. Elevate legs while up in chair.    Specialty Bed Required : No   [] Low Air Loss   [] Pressure Redistribution  [] Fluid Immersion  [] Bariatric  [] Total Pressure Relief  [] Other:     Current Diet: ADULT DIET; Regular; 4 carb choices (60 gm/meal)  Dietician consult:  Yes    Discharge Plan:  Placement for patient upon discharge: home with support   Patient appropriate for Outpatient Wound Care Center: Yes, [facility]    Referrals:  []   [] Home Health Care  [] Supplies  [] Other    Patient/Caregiver Teaching:  Level of patient/caregiver understanding able to:   [x] Indicates understanding       [x] Needs reinforcement  [] Unsuccessful      [] Verbal Understanding  [] Demonstrated understanding       [] No evidence of learning  [] Refused teaching         [] N/A       Electronically signed by ETHEL ESTRADA, RN, CWOCN  Inpatient  Wound/Ostomy Care  [phone]  on 8/20/2024 at 12:21 PM

## 2024-08-20 NOTE — PLAN OF CARE
Problem: Discharge Planning  Goal: Discharge to home or other facility with appropriate resources  8/19/2024 2323 by Agapito Villanueva RN  Outcome: Progressing  8/19/2024 0931 by Batsheva Lobo RN  Outcome: Progressing  Flowsheets (Taken 8/19/2024 0845)  Discharge to home or other facility with appropriate resources: Identify barriers to discharge with patient and caregiver     Problem: Pain  Goal: Verbalizes/displays adequate comfort level or baseline comfort level  8/19/2024 2323 by Agapito Villanueva RN  Outcome: Progressing  8/19/2024 0931 by Batsheva Lobo RN  Outcome: Progressing  Flowsheets (Taken 8/19/2024 0845)  Verbalizes/displays adequate comfort level or baseline comfort level: Encourage patient to monitor pain and request assistance     Problem: Safety - Adult  Goal: Free from fall injury  8/19/2024 2323 by Agapito Villanueva RN  Outcome: Progressing  8/19/2024 0931 by Batsheva Lobo RN  Outcome: Progressing     Problem: Skin/Tissue Integrity  Goal: Absence of new skin breakdown  Description: 1.  Monitor for areas of redness and/or skin breakdown  2.  Assess vascular access sites hourly  3.  Every 4-6 hours minimum:  Change oxygen saturation probe site  4.  Every 4-6 hours:  If on nasal continuous positive airway pressure, respiratory therapy assess nares and determine need for appliance change or resting period.  8/19/2024 2323 by Agapito Villanueva RN  Outcome: Progressing  8/19/2024 0931 by Batsheva Lobo RN  Outcome: Progressing     Problem: ABCDS Injury Assessment  Goal: Absence of physical injury  8/19/2024 2323 by Agapito Villanueva RN  Outcome: Progressing  8/19/2024 0931 by Batsheva Lobo RN  Outcome: Progressing     Problem: Chronic Conditions and Co-morbidities  Goal: Patient's chronic conditions and co-morbidity symptoms are monitored and maintained or improved  Outcome: Progressing     Problem: Respiratory - Adult  Goal: Achieves optimal ventilation and oxygenation  Outcome:

## 2024-08-20 NOTE — CONSULTS
Podiatry Progress Note    Subjective:  Gamal Blue is a(n) 73 y.o. male with past medical history as below   who arrives via EMS for recurrent falls.  Patient states that he was unable to get out of bed today due to generalized fatigue.  States that he has been on an oral antibiotic for cellulitis of the left lower extremity, but symptoms not improved affirms shortness of breath.  States that he does not wear oxygen at home.  He does affirm a history of a DVT that he used to take blood thinners for but states he does not take them anymore     Podiatry consulted for evaluation of L leg cellulitis.     ROS: Denies nausea, vomiting, fevers, chills.    Past Medical History:   Diagnosis Date    COPD (chronic obstructive pulmonary disease) (HCC)     Diabetes mellitus (HCC)     Hyperlipidemia     Hypertension         Allergies   Allergen Reactions    Dilaudid [Hydromorphone Hcl]      Violent     Hydrochlorothiazide Itching        Current Facility-Administered Medications   Medication Dose Route Frequency Provider Last Rate Last Admin    insulin lispro (HUMALOG,ADMELOG) injection vial 0-16 Units  0-16 Units SubCUTAneous TID  Santana Tabor MD   16 Units at 08/20/24 0845    insulin lispro (HUMALOG,ADMELOG) injection vial 0-4 Units  0-4 Units SubCUTAneous Nightly Santana Tabor MD        insulin lispro (HUMALOG,ADMELOG) injection vial 5 Units  5 Units SubCUTAneous TID  Santana Tabor MD   5 Units at 08/20/24 0844    pantoprazole (PROTONIX) injection 40 mg  40 mg IntraVENous Daily Santana Tabor MD   40 mg at 08/20/24 0845    lactobacillus (CULTURELLE) capsule 1 capsule  1 capsule Oral BID  Santana Tabor MD   1 capsule at 08/20/24 0844    albuterol (PROVENTIL) (2.5 MG/3ML) 0.083% nebulizer solution 2.5 mg  2.5 mg Nebulization 4x Daily RT Santana Tabor MD        insulin regular (HumuLIN R;NovoLIN R) injection 5 Units  5 Units IntraVENous Once Santana Tabor MD        ceFEPIme (MAXIPIME) 2,000 mg in sodium

## 2024-08-20 NOTE — PROGRESS NOTES
Infectious Diseases   Progress Note      Admission Date: 8/18/2024  Hospital Day: Hospital Day: 3   Attending: Santana Tabor MD  Date of service: 8/20/2024     Chief complaint/ Reason for consult:     Sepsis with fever of 100.8, leukocytosis, tachycardia and tachypnea  Left lower extremity cellulitis  Complicated left diabetic foot infection  Poorly controlled type 2 diabetes mellitus  Failure of outpatient antibiotics  Acute kidney injury    Microbiology:        I have reviewed allavailable micro lab data and cultures    Blood culture (2/2) - collected on 8/18/2024: In process  Left foot wound culture  - collected on 8/19/2024: Corynebacterium      Antibiotics and immunizations:       Current antibiotics: All antibiotics and their doses were reviewed by me    Recent Abx Admin                     ceFEPIme (MAXIPIME) 2,000 mg in sodium chloride 0.9 % 100 mL IVPB (mini-bag) (mg) 2,000 mg New Bag 08/20/24 0856     2,000 mg New Bag 08/19/24 2031    DAPTOmycin (CUBICIN) 550 mg in sodium chloride 0.9 % 50 mL IVPB (mg) 550 mg New Bag 08/19/24 1657                      Immunization History: All immunization history was reviewed by me today.      There is no immunization history on file for this patient.    Known drug allergies:     All allergies were reviewed and updated    Allergies   Allergen Reactions    Dilaudid [Hydromorphone Hcl]      Violent     Hydrochlorothiazide Itching       Social history:     Social History:  All social andepidemiologic history was reviewed and updated by me today as needed.     Tobacco use:   reports that he has been smoking cigarettes. He has never used smokeless tobacco.  Alcohol use:   reports no history of alcohol use.  Currently lives in: [place] [address]   reports no history of drug use.     COVID VACCINATION AND LAB RESULT RECORDS:     Internal Administration   First Dose      Second Dose           Last COVID Lab SARS-CoV-2 RNA, RT PCR (no units)   Date Value   08/19/2024 NOT  G47.30, E66.9    CAD (coronary artery disease) I25.10    Septicemia (HCC) A41.9    Elevated sed rate R70.0    CRP elevated R79.82    Cellulitis of left leg L03.116    Lactic acidosis E87.20       Please note that this chart was generated using Dragon dictation software. Although every effort was made to ensure the accuracy of this automated transcription, some errors in transcription may have occurred inadvertently. If you may need any clarification, please do not hesitate to contact me through EPIC or at the phone number provided below with my electronic signature.  Any pictures or media included in this note were obtained after taking informed verbal consent from the patient and with their approval to include those in the patient's medical record.        Eleno Fried MD, MPH, FACP, [facility]  8/20/2024, 1:42 PM  Central Office Phone: [phone]  Central Office Fax: [phone]    [facility] Infectious Disease   [address], [address] ([address])  [place] [address]  [facility] days:  Tuesday & Thursday AM    [facility] Infectious Disease  [address] , [address] (Medical office Building)  [place], [place], [address]  [facility] days: Wednesday AM

## 2024-08-20 NOTE — CARE COORDINATION
DISCHARGE PLANNING:   CM received a call from RN  [facility] stating that [facility] . Still awaiting for bed availability. She also reported patient's  ad[facility] was [facility], patient should  not be concerned of the bill. She will co-ordinate with [facility] regarding transportation when the bed becomes available.

## 2024-08-20 NOTE — RT PROTOCOL NOTE
RT Inhaler-Nebulizer Bronchodilator Protocol Note    There is a bronchodilator order in the chart from a provider indicating to follow the RT Bronchodilator Protocol and there is an “Initiate RT Inhaler-Nebulizer Bronchodilator Protocol” order as well (see protocol at bottom of note).    CXR Findings:  XR CHEST PORTABLE    Result Date: 8/18/2024  Bilateral patchy interstitial opacities, concerning for pulmonary edema or multifocal pneumonia.       The findings from the last RT Protocol Assessment were as follows:   History Pulmonary Disease: Chronic pulmonary disease  Respiratory Pattern: Dyspnea on exertion or RR 21-25 bpm  Breath Sounds: Inspiratory and expiratory or bilateral wheezing and/or rhonchi  Cough: Strong, productive  Indication for Bronchodilator Therapy: On home bronchodilators  Bronchodilator Assessment Score: 11    Aerosolized bronchodilator medication orders have been revised according to the RT Inhaler-Nebulizer Bronchodilator Protocol below.    Respiratory Therapist to perform RT Therapy Protocol Assessment initially then follow the protocol.  Repeat RT Therapy Protocol Assessment PRN for score 0-3 or on second treatment, BID, and PRN for scores above 3.    No Indications - adjust the frequency to every 6 hours PRN wheezing or bronchospasm, if no treatments needed after 48 hours then discontinue using Per Protocol order mode.     If indication present, adjust the RT bronchodilator orders based on the Bronchodilator Assessment Score as indicated below.  Use Inhaler orders unless patient has one or more of the following: on home nebulizer, not able to hold breath for 10 seconds, is not alert and oriented, cannot activate and use MDI correctly, or respiratory rate 25 breaths per minute or more, then use the equivalent nebulizer order(s) with same Frequency and PRN reasons based on the score.  If a patient is on this medication at home then do not decrease Frequency below that used at home.    0-3 -

## 2024-08-20 NOTE — PROGRESS NOTES
Admit Date: 8/18/2024  Diet: ADULT DIET; Regular; 4 carb choices (60 gm/meal)    CC: LE cellulitis    Interval history:   Overnight, patient was febrile with a Tmax of 100.8 at 0539.  Other vitals remained stable.      Patient was seen this morning. I discussed the plan of the day with him in detail. All questions were addressed and answered to patient's satisfaction. Patient denies fevers, chills, nausea, vomiting, chest pain, shortness of breath, diarrhea, constipation, dysuria, urinary frequency or urgency.     Plan:     -Continue IV daptomycin and cefepime  -Probiotics  -Complete IV steroid course for COPD today  -Treat and manage hyperglycemia in light of steroids  -[facility] that [facility] and since he is hospitalized here he will be getting a bill and therefore wants to transfer; [facility] so awaiting for a [facility]    Assessment:   Gamal Blue is a 73 y.o. male with PMH of COPD, type 2 diabetes, hypertension, VTE, CAD, obesity with sleep apnea on CPAP, hyperlipidemia, stage III CKD with baseline creatinine of 1.6-1.7  who was admitted with LE Cellulitis      Sepsis due to cellulitis (HCC)    Left lower extremity cellulitis  Left lower extremity with erythema over the shin as well as the foot with disclamation of the skin on the toes, fluctuance noted.  Concerning for abscess.  - ID consult  - Podiatry consult  - Pain management       COPD exacerbation (HCC)    Acute on chronic respiratory failure with hypoxia (HCC)  On presentation, patient had desaturations of 85% on room air.  At baseline he reports not to be on oxygen and uses BiPAP at night.  - Will continue breathing therapy with cardiopulmonary protocol  - Continue systemic steroids  - O2 supplementation with target sats greater than 90%       Acute kidney injury superimposed on chronic kidney disease (HCC): In the setting of sepsis.  Baseline creatinine around 1.6.  Peak  deficits  Psychiatric: Alert and oriented, thought content appropriate  Capillary Refill: Brisk,< 3 seconds   Peripheral Pulses: +2 palpable, equal bilaterally     Review of Systems  - Negative except Interval History    LABS:    CBC:   Recent Labs     08/18/24 2037 08/19/24 0443 08/20/24  0842   WBC 17.8* 14.8* 14.8*   HGB 11.3* 10.3* 10.1*   HCT 35.3* 31.5* 31.8*    210 203   MCV 75.9* 75.9* 76.6*     Renal:    Recent Labs     08/18/24 2037 08/19/24  0443 08/20/24  0842   * 135* 127*   K 3.8 3.7 4.3    103 97*   CO2 18* 20* 18*   BUN 25* 20 27*   CREATININE 2.2* 1.8* 1.4*   GLUCOSE 291* 186* 396*   CALCIUM 8.7 8.1* 8.0*   MG 1.70*  --  2.50*   PHOS 2.0* 3.6  --    ANIONGAP 14 12 12     Hepatic:   Recent Labs     08/18/24 2037 08/20/24  0842   AST 11* 16   ALT 7* 12   BILITOT 0.4 <0.2   ALKPHOS 73 62     Troponin: No results for input(s): \"TROPONINI\" in the last 72 hours.  BNP: No results for input(s): \"BNP\" in the last 72 hours.  Lipids: No results for input(s): \"CHOL\", \"HDL\" in the last 72 hours.    Invalid input(s): \"LDLCALCU\", \"TRIGLYCERIDE\"  ABGs:  No results for input(s): \"PHART\", \"TYF3DLY\", \"PO2ART\", \"CNC8VTK\", \"BEART\", \"THGBART\", \"Z7HOZORH\", \"RXC5QQM\" in the last 72 hours.    INR:   Recent Labs     08/18/24 2037   INR 1.45*     Lactate: No results for input(s): \"LACTATE\" in the last 72 hours.  Cultures:  -----------------------------------------------------------------  RAD:   MRI FOOT LEFT WO CONTRAST   Final Result   1.  No evidence of osteomyelitis or abscess.      2.  Significant soft tissue swelling over the dorsum of the foot         CT CHEST PULMONARY EMBOLISM W CONTRAST   Final Result   No pulmonary embolism.  Emphysema without acute pulmonary abnormality.         CT HEAD WO CONTRAST   Final Result   No acute intracranial abnormality is identified.         XR CHEST PORTABLE   Final Result   Bilateral patchy interstitial opacities, concerning for pulmonary edema or    multifocal pneumonia.             Medications:     Scheduled Meds:   insulin lispro  0-16 Units SubCUTAneous TID WC    insulin lispro  0-4 Units SubCUTAneous Nightly    insulin lispro  5 Units SubCUTAneous TID WC    pantoprazole  40 mg IntraVENous Daily    lactobacillus  1 capsule Oral BID WC    albuterol  2.5 mg Nebulization 4x Daily RT    insulin regular  5 Units IntraVENous Once    cefepime  2,000 mg IntraVENous Q12H    apixaban  5 mg Oral BID    [Held by provider] atorvastatin  20 mg Oral Daily    budesonide-formoterol  2 puff Inhalation BID    clopidogrel  75 mg Oral Daily    tamsulosin  0.4 mg Oral Daily    sodium chloride flush  5-40 mL IntraVENous 2 times per day    methylPREDNISolone  40 mg IntraVENous Q8H    nicotine  1 patch TransDERmal Daily    DAPTOmycin (CUBICIN) 550 mg in sodium chloride 0.9 % 50 mL IVPB  6 mg/kg (Adjusted) IntraVENous Q24H    gabapentin  300 mg Oral TID    insulin glargine  60 Units SubCUTAneous Nightly     Continuous Infusions:   sodium chloride 25 mL (08/20/24 0853)    dextrose       PRN Meds:albuterol, labetalol, sodium chloride flush, sodium chloride, magnesium sulfate, ondansetron **OR** ondansetron, polyethylene glycol, acetaminophen **OR** acetaminophen, sodium phosphate 15 mmol in sodium chloride 0.9 % 250 mL IVPB, dextrose bolus **OR** dextrose bolus, glucagon (rDNA), dextrose, traMADol

## 2024-08-21 PROBLEM — B95.4 BACTERIAL INFECTION DUE TO STREPTOCOCCUS, GROUP G: Status: ACTIVE | Noted: 2024-08-21

## 2024-08-21 PROBLEM — Z71.89 DIABETES EDUCATION, ENCOUNTER FOR: Status: ACTIVE | Noted: 2024-08-21

## 2024-08-21 PROBLEM — A49.9 GRAM-NEGATIVE INFECTION: Status: ACTIVE | Noted: 2024-08-21

## 2024-08-21 LAB
ALBUMIN SERPL-MCNC: 2.7 G/DL (ref 3.4–5)
ALBUMIN/GLOB SERPL: 0.7 {RATIO} (ref 1.1–2.2)
ALP SERPL-CCNC: 62 U/L (ref 40–129)
ALT SERPL-CCNC: 11 U/L (ref 10–40)
ANION GAP SERPL CALCULATED.3IONS-SCNC: 11 MMOL/L (ref 3–16)
AST SERPL-CCNC: 11 U/L (ref 15–37)
BASOPHILS # BLD: 0 K/UL (ref 0–0.2)
BASOPHILS NFR BLD: 0 %
BILIRUB SERPL-MCNC: <0.2 MG/DL (ref 0–1)
BUN SERPL-MCNC: 29 MG/DL (ref 7–20)
CALCIUM SERPL-MCNC: 7.9 MG/DL (ref 8.3–10.6)
CHLORIDE SERPL-SCNC: 97 MMOL/L (ref 99–110)
CO2 SERPL-SCNC: 18 MMOL/L (ref 21–32)
CREAT SERPL-MCNC: 1.3 MG/DL (ref 0.8–1.3)
DEPRECATED RDW RBC AUTO: 17.4 % (ref 12.4–15.4)
EOSINOPHIL # BLD: 0 K/UL (ref 0–0.6)
EOSINOPHIL NFR BLD: 0 %
GFR SERPLBLD CREATININE-BSD FMLA CKD-EPI: 58 ML/MIN/{1.73_M2}
GLUCOSE BLD-MCNC: 264 MG/DL (ref 70–99)
GLUCOSE BLD-MCNC: 294 MG/DL (ref 70–99)
GLUCOSE BLD-MCNC: 352 MG/DL (ref 70–99)
GLUCOSE BLD-MCNC: 357 MG/DL (ref 70–99)
GLUCOSE BLD-MCNC: 432 MG/DL (ref 70–99)
GLUCOSE SERPL-MCNC: 334 MG/DL (ref 70–99)
HCT VFR BLD AUTO: 31.3 % (ref 40.5–52.5)
HGB BLD-MCNC: 9.9 G/DL (ref 13.5–17.5)
LYMPHOCYTES # BLD: 0.6 K/UL (ref 1–5.1)
LYMPHOCYTES NFR BLD: 5.3 %
MAGNESIUM SERPL-MCNC: 2.5 MG/DL (ref 1.8–2.4)
MCH RBC QN AUTO: 24.1 PG (ref 26–34)
MCHC RBC AUTO-ENTMCNC: 31.8 G/DL (ref 31–36)
MCV RBC AUTO: 75.9 FL (ref 80–100)
MONOCYTES # BLD: 0.4 K/UL (ref 0–1.3)
MONOCYTES NFR BLD: 2.9 %
NEUTROPHILS # BLD: 11.1 K/UL (ref 1.7–7.7)
NEUTROPHILS NFR BLD: 91.8 %
PERFORMED ON: ABNORMAL
PLATELET # BLD AUTO: 198 K/UL (ref 135–450)
PMV BLD AUTO: 7.9 FL (ref 5–10.5)
POTASSIUM SERPL-SCNC: 4.5 MMOL/L (ref 3.5–5.1)
PROT SERPL-MCNC: 6.7 G/DL (ref 6.4–8.2)
RBC # BLD AUTO: 4.12 M/UL (ref 4.2–5.9)
SODIUM SERPL-SCNC: 126 MMOL/L (ref 136–145)
WBC # BLD AUTO: 12.1 K/UL (ref 4–11)

## 2024-08-21 PROCEDURE — 99233 SBSQ HOSP IP/OBS HIGH 50: CPT | Performed by: INTERNAL MEDICINE

## 2024-08-21 PROCEDURE — 2700000000 HC OXYGEN THERAPY PER DAY

## 2024-08-21 PROCEDURE — 6370000000 HC RX 637 (ALT 250 FOR IP): Performed by: INTERNAL MEDICINE

## 2024-08-21 PROCEDURE — 97530 THERAPEUTIC ACTIVITIES: CPT

## 2024-08-21 PROCEDURE — 97535 SELF CARE MNGMENT TRAINING: CPT

## 2024-08-21 PROCEDURE — 2580000003 HC RX 258: Performed by: NURSE PRACTITIONER

## 2024-08-21 PROCEDURE — 94640 AIRWAY INHALATION TREATMENT: CPT

## 2024-08-21 PROCEDURE — 94660 CPAP INITIATION&MGMT: CPT

## 2024-08-21 PROCEDURE — 2060000000 HC ICU INTERMEDIATE R&B

## 2024-08-21 PROCEDURE — 6360000002 HC RX W HCPCS: Performed by: STUDENT IN AN ORGANIZED HEALTH CARE EDUCATION/TRAINING PROGRAM

## 2024-08-21 PROCEDURE — 6370000000 HC RX 637 (ALT 250 FOR IP): Performed by: STUDENT IN AN ORGANIZED HEALTH CARE EDUCATION/TRAINING PROGRAM

## 2024-08-21 PROCEDURE — 83735 ASSAY OF MAGNESIUM: CPT

## 2024-08-21 PROCEDURE — 94761 N-INVAS EAR/PLS OXIMETRY MLT: CPT

## 2024-08-21 PROCEDURE — 85025 COMPLETE CBC W/AUTO DIFF WBC: CPT

## 2024-08-21 PROCEDURE — 6370000000 HC RX 637 (ALT 250 FOR IP): Performed by: NURSE PRACTITIONER

## 2024-08-21 PROCEDURE — 6360000002 HC RX W HCPCS: Performed by: NURSE PRACTITIONER

## 2024-08-21 PROCEDURE — 36415 COLL VENOUS BLD VENIPUNCTURE: CPT

## 2024-08-21 PROCEDURE — 80053 COMPREHEN METABOLIC PANEL: CPT

## 2024-08-21 PROCEDURE — 6370000000 HC RX 637 (ALT 250 FOR IP): Performed by: PODIATRIST

## 2024-08-21 PROCEDURE — 83036 HEMOGLOBIN GLYCOSYLATED A1C: CPT

## 2024-08-21 RX ORDER — INSULIN LISPRO 100 [IU]/ML
8 INJECTION, SOLUTION INTRAVENOUS; SUBCUTANEOUS
Status: DISCONTINUED | OUTPATIENT
Start: 2024-08-21 | End: 2024-08-27 | Stop reason: HOSPADM

## 2024-08-21 RX ADMIN — INSULIN LISPRO 8 UNITS: 100 INJECTION, SOLUTION INTRAVENOUS; SUBCUTANEOUS at 17:44

## 2024-08-21 RX ADMIN — GABAPENTIN 300 MG: 300 CAPSULE ORAL at 11:39

## 2024-08-21 RX ADMIN — TRAMADOL HYDROCHLORIDE 50 MG: 50 TABLET ORAL at 21:44

## 2024-08-21 RX ADMIN — Medication 10 ML: at 21:51

## 2024-08-21 RX ADMIN — Medication 1 CAPSULE: at 11:39

## 2024-08-21 RX ADMIN — TRAMADOL HYDROCHLORIDE 50 MG: 50 TABLET ORAL at 11:44

## 2024-08-21 RX ADMIN — BUDESONIDE AND FORMOTEROL FUMARATE DIHYDRATE 2 PUFF: 160; 4.5 AEROSOL RESPIRATORY (INHALATION) at 08:41

## 2024-08-21 RX ADMIN — APIXABAN 5 MG: 5 TABLET, FILM COATED ORAL at 11:39

## 2024-08-21 RX ADMIN — INSULIN LISPRO 16 UNITS: 100 INJECTION, SOLUTION INTRAVENOUS; SUBCUTANEOUS at 11:46

## 2024-08-21 RX ADMIN — CEFEPIME 2000 MG: 2 INJECTION, POWDER, FOR SOLUTION INTRAVENOUS at 21:50

## 2024-08-21 RX ADMIN — BUDESONIDE AND FORMOTEROL FUMARATE DIHYDRATE 2 PUFF: 160; 4.5 AEROSOL RESPIRATORY (INHALATION) at 20:14

## 2024-08-21 RX ADMIN — Medication 10 ML: at 11:38

## 2024-08-21 RX ADMIN — GABAPENTIN 300 MG: 300 CAPSULE ORAL at 17:55

## 2024-08-21 RX ADMIN — SODIUM CHLORIDE 25 ML: 9 INJECTION, SOLUTION INTRAVENOUS at 11:47

## 2024-08-21 RX ADMIN — APIXABAN 5 MG: 5 TABLET, FILM COATED ORAL at 21:40

## 2024-08-21 RX ADMIN — CLOPIDOGREL BISULFATE 75 MG: 75 TABLET ORAL at 11:44

## 2024-08-21 RX ADMIN — INSULIN LISPRO 8 UNITS: 100 INJECTION, SOLUTION INTRAVENOUS; SUBCUTANEOUS at 11:38

## 2024-08-21 RX ADMIN — INSULIN GLARGINE 60 UNITS: 100 INJECTION, SOLUTION SUBCUTANEOUS at 21:40

## 2024-08-21 RX ADMIN — ALBUTEROL SULFATE 2.5 MG: 2.5 SOLUTION RESPIRATORY (INHALATION) at 08:41

## 2024-08-21 RX ADMIN — Medication 1 CAPSULE: at 17:55

## 2024-08-21 RX ADMIN — GABAPENTIN 300 MG: 300 CAPSULE ORAL at 21:40

## 2024-08-21 RX ADMIN — INSULIN LISPRO 8 UNITS: 100 INJECTION, SOLUTION INTRAVENOUS; SUBCUTANEOUS at 17:45

## 2024-08-21 RX ADMIN — CEFEPIME 2000 MG: 2 INJECTION, POWDER, FOR SOLUTION INTRAVENOUS at 11:50

## 2024-08-21 RX ADMIN — ALBUTEROL SULFATE 2.5 MG: 2.5 SOLUTION RESPIRATORY (INHALATION) at 16:58

## 2024-08-21 RX ADMIN — ALBUTEROL SULFATE 2.5 MG: 2.5 SOLUTION RESPIRATORY (INHALATION) at 20:14

## 2024-08-21 RX ADMIN — MUPIROCIN: 20 OINTMENT TOPICAL at 11:38

## 2024-08-21 RX ADMIN — PANTOPRAZOLE SODIUM 40 MG: 40 INJECTION, POWDER, FOR SOLUTION INTRAVENOUS at 11:38

## 2024-08-21 RX ADMIN — INSULIN HUMAN 5 UNITS: 100 INJECTION, SOLUTION PARENTERAL at 15:50

## 2024-08-21 RX ADMIN — ALBUTEROL SULFATE 2.5 MG: 2.5 SOLUTION RESPIRATORY (INHALATION) at 12:50

## 2024-08-21 RX ADMIN — TAMSULOSIN HYDROCHLORIDE 0.4 MG: 0.4 CAPSULE ORAL at 11:39

## 2024-08-21 ASSESSMENT — PAIN DESCRIPTION - ORIENTATION
ORIENTATION: LEFT
ORIENTATION: LEFT

## 2024-08-21 ASSESSMENT — PAIN DESCRIPTION - DESCRIPTORS
DESCRIPTORS: THROBBING
DESCRIPTORS: THROBBING

## 2024-08-21 ASSESSMENT — PAIN DESCRIPTION - LOCATION
LOCATION: FOOT
LOCATION: FOOT;LEG

## 2024-08-21 ASSESSMENT — PAIN SCALES - GENERAL
PAINLEVEL_OUTOF10: 9
PAINLEVEL_OUTOF10: 8

## 2024-08-21 NOTE — PROGRESS NOTES
and documented communication among disciplines, who will work together to establish, prioritize, and achieve treatment goals. Please see assessment section for further patient specific details.    If patient discharges prior to next session this note will serve as a discharge summary.  Please see below for the latest assessment towards goals.      DME Required For Discharge: DME to be determined at next level of care    Precautions/Restrictions: high fall risk  Weight Bearing Restrictions: no restrictions    Required Braces/Orthotics: no braces required  Positional Restrictions:no positional restrictions    Pre-Admission Information   Lives With: spouse And cat, brother lives in [facility]   Type of Home: house  Home Layout: one level  Home Access: ramped entry  Bathroom Layout: walk in shower  Bathroom Equipment: grab bars in shower, shower chair  Toilet Height: elevated height  Home Equipment: rollator - 4 wheeled walker, single point cane, electric scooter, alert button, adjustable bed  Transfer Assistance: modified independent with use of SPC   Ambulation Assistance:modified independent with use of SPC in home, 4WW or scooter outside of the home.   ADL Assistance: independent with all ADL's  IADL Assistance: independent with homemaking tasks  Active :        [x] Yes  [] No  Hand Dominance: [] Left  [x] Right  Current Employment: full time employment.  Occupation: [employer]   Hobbies: shopping  Recent Falls: Pt reports 2 falls that lead to this admission.   Comment: pt's spouse is currently at [facility] for rehabilitation s/p IM nailing and is currently NWB. Pt planning on being wife caregiver.         Subjective  General: Pt supine in bed and asleep, agreeable to OT tx. Pt is pleasant and cooperative. Reporting refusing current rehab recommendations because he \"needs to get home and take care of the cat...I need to get things ready for my wife\". Pt politely continues to decline rehab recommendation.    Pain: Patient does not rate upon questioning  Pain Interventions: pain medication in place prior to arrival, increased time        Activities of Daily Living  Basic Activities of Daily Living  Upper Extremity Bathing: stand by assistance requires verbal cueing Increased time to complete task  Lower Extremity Bathing: contact guard assistance requires verbal cueing Increased time to complete task cleaned upper legs only   Bathing Comments: sponge bathing while seated upright in recliner chair, declining showering at this time d/t dressings on BLE  Upper Extremity Dressing: setup assistance don/[facility] gown  Lower Extremity Dressing: unable to assess secondary to pt declines donning/doffing pull up brief  Dressing Comments: seated on recliner chair  Toileting: dependent.    Toileting Comments: use of external male catheter, edu for disuse during day and to attempt to complete in bathroom  Instrumental Activities of Daily Living  No IADL completed on this date.  Functional Mobility  Bed Mobility:  Supine to Sit: stand by assistance  Scooting: stand by assistance  Comments: pt on BSC at Sos and in recliner chair at EOS  Transfers:  Sit to stand transfer:contact guard assistance  Stand to sit transfer: contact guard assistance  Comments:education for hand placement, good carryover of learned information during session  Functional Mobility  Functional Mobility Activity: ~25 ft  Device Use: rolling walker  Required Assistance: contact guard assistance  Comment: education for use of BUE to off-weight LLE for pain reduction  Balance:  Static Sitting Balance: fair (+): maintains balance at SBA/supervision without use of UE support  Dynamic Sitting Balance: fair (+): maintains balance at SBA/supervision without use of UE support  Static Standing Balance: fair (-): maintains balance at CGA with use of UE support  Dynamic Standing Balance: fair (-): maintains balance at CGA with use of UE support  Comments:limited by  toileting at minimal assistance   Patient will complete functional transfers at stand by assistance   Patient will complete functional mobility at stand by assistance     Above goals reviewed on 8/21/2024.  All goals are ongoing at this time unless indicated above.       Therapy Session Time     Individual Group Co-treatment   Time In 1448     Time Out 1552     Minutes 64          Timed Code Treatment Minutes:  Timed Code Treatment Minutes: 64 Minutes    Total Treatment Minutes:  64       Electronically Signed By: LIANA Maxwell OTR/L, CNS ([id number]) 8/21/2024 4:06 PM

## 2024-08-21 NOTE — PROGRESS NOTES
Physical Therapy  Gamal Blue  PT treatment attempted. Pt currently working with OT. Will follow up as schedule allows.  Thanks, Sherie Adame, [id number]

## 2024-08-21 NOTE — PROGRESS NOTES
Patient alert and oriented x 4 and up x 1 walker. Patient voiding via pure wick. Patient tolerating diet with no c/o nausea at this time. Patient does c/o pain to bilateral feet, PRN tramadol given per order, see MAR.Initial assessment completed, see flowsheet. IV antibiotics infusing per order. Patient's bed is locked and in lowest position, side rails up x 2 with an active bed alarm. Non slip socks applied.

## 2024-08-21 NOTE — PROGRESS NOTES
performed using RODR[facility]GUEZ MONA SARS-CoV-2 and Influenza A/B  nucleic acid assay. This test is a multiplex Real-Time Reverse  Transcriptase Polymerase Chain Reaction (RT-PCR)-based in vitro  diagnostic test intended for the qualitative detection of nucleic  acids from SARS-CoV-2, influenza A, and influenza B in nasopharyngeal  and nasal swab specimens for use under the FDA’s Emergency Use  Authorization (EUA) only.    Patient Fact Sheet:  https://www.fda.gov/media/754072/download  Provider Fact Sheet: https://www.fda.gov/media/806384/download  EUA: https://www.fda.gov/media/819683/download  IFU: https://www.fda.gov/media/260860/download    Methodology:  RT-PCR          Influenza A NOT DETECTED     Influenza B NOT DETECTED    Blood Culture 1 [[id number]] Collected: 08/18/24 2038    Order Status: Completed Specimen: Blood Updated: 08/19/24 2115     Blood Culture, Routine No Growth to date.  Any change in status will be called.    Narrative:      ORDER#: [id number]                          ORDERED BY: MIREYA LEMA  SOURCE: Blood Antecubital-Rig              COLLECTED:  08/18/24 20:38  ANTIBIOTICS AT ANURAG.:                      RECEIVED :  08/19/24 00:57  If child <=2 yrs old please draw pediatric bottle.~Blood Culture 1    Blood Culture 2 [[id number]] Collected: 08/18/24 2038    Order Status: Completed Specimen: Blood Updated: 08/19/24 2115     Culture, Blood 2 No Growth to date.  Any change in status will be called.    Narrative:      ORDER#: [id number]                          ORDERED BY: MIREYA LEMA  SOURCE: Blood Antecubital-Rig              COLLECTED:  08/18/24 20:38  ANTIBIOTICS AT ANURAG.:                      RECEIVED :  08/19/24 00:57  If child <=2 yrs old please draw pediatric bottle.~Blood Culture #2             Antibiotics and immunizations:       Current antibiotics: All antibiotics and their doses were reviewed by me    Recent Abx Admin                     ceFEPIme (MAXIPIME) 2,000 mg in sodium chloride 0.9  reviewed the chest x-ray/CT scan/MRI images and independently interpreted the findings and results today.    MRI FOOT LEFT WO CONTRAST   Final Result   1.  No evidence of osteomyelitis or abscess.      2.  Significant soft tissue swelling over the dorsum of the foot         CT CHEST PULMONARY EMBOLISM W CONTRAST   Final Result   No pulmonary embolism.  Emphysema without acute pulmonary abnormality.         CT HEAD WO CONTRAST   Final Result   No acute intracranial abnormality is identified.         XR CHEST PORTABLE   Final Result   Bilateral patchy interstitial opacities, concerning for pulmonary edema or   multifocal pneumonia.             Medications: All current and past medications were reviewed.     insulin lispro  8 Units SubCUTAneous TID WC    insulin lispro  0-16 Units SubCUTAneous TID WC    insulin lispro  0-4 Units SubCUTAneous Nightly    pantoprazole  40 mg IntraVENous Daily    lactobacillus  1 capsule Oral BID WC    albuterol  2.5 mg Nebulization 4x Daily RT    mupirocin   Topical Daily    cefepime  2,000 mg IntraVENous Q12H    apixaban  5 mg Oral BID    atorvastatin  20 mg Oral Daily    budesonide-formoterol  2 puff Inhalation BID    clopidogrel  75 mg Oral Daily    tamsulosin  0.4 mg Oral Daily    sodium chloride flush  5-40 mL IntraVENous 2 times per day    nicotine  1 patch TransDERmal Daily    gabapentin  300 mg Oral TID    insulin glargine  60 Units SubCUTAneous Nightly        sodium chloride 25 mL (08/21/24 1147)    dextrose         albuterol, labetalol, sodium chloride flush, sodium chloride, magnesium sulfate, ondansetron **OR** ondansetron, polyethylene glycol, acetaminophen **OR** acetaminophen, sodium phosphate 15 mmol in sodium chloride 0.9 % 250 mL IVPB, dextrose bolus **OR** dextrose bolus, glucagon (rDNA), dextrose, traMADol      Problem list:       Patient Active Problem List   Diagnosis Code    Sepsis due to cellulitis ([facility]) L03.90, A41.9    COPD exacerbation ([facility]) J44.1    Acute on  chronic respiratory failure with hypoxia (HCC) J96.21    Acute kidney injury superimposed on chronic kidney disease (HCC) N17.9, N18.9    Elevated lactic acid level R79.89    Elevated brain natriuretic peptide (BNP) level R79.89    Hypomagnesemia E83.42    Hypophosphatemia E83.39    Type 2 diabetes mellitus (HCC) E11.9    Obesity with sleep apnea G47.30, E66.9    CAD (coronary artery disease) I25.10    Septicemia (HCC) A41.9    Elevated sed rate R70.0    CRP elevated R79.82    Cellulitis of left leg L03.116    Lactic acidosis E87.20    Diabetes education, encounter for Z71.89    Gram-negative infection A49.9    Bacterial infection due to Streptococcus, group G B95.4       Please note that this chart was generated using Dragon dictation software. Although every effort was made to ensure the accuracy of this automated transcription, some errors in transcription may have occurred inadvertently. If you may need any clarification, please do not hesitate to contact me through EPIC or at the phone number provided below with my electronic signature.  Any pictures or media included in this note were obtained after taking informed verbal consent from the patient and with their approval to include those in the patient's medical record.        Eleno Fried MD, MPH, FACP, [facility]  8/21/2024, 8:20 PM  Central Office Phone: [phone]  Central Office Fax: [phone]    [facility]   [address], [address] ([address])  [place] [address]  [facility] days:  Tuesday & Thursday AM    [facility]  [address] , [address] (Medical office Building)  [place], [address]  [facility] days: Wednesday AM

## 2024-08-21 NOTE — CARE COORDINATION
Discharge Planning Note Re: Skilled Nursing     CM/SW noted consult for discharge planning. Chart reviewed. Noted recommendations for SNF.  CM met with patient. Introduced self and explained role of CM and discharge planning.    Patient is agreeable to SNF on dc.     [place] of choice list was provided with basic dialogue that supports the patient's individualized plan of care/goals, treatment preferences and shares the quality data associated with the providers. [x] Yes [] No.  Patient did not want to review the list, instead he stated he wants to go to the [facility] for short term rehab.  Patient stated that if he does not get into the [facility] he wants to go home with [facility].    Referral made to the [facility] via telephone.   CM called Analy Ramírez. Analy's VM stated she is out of the office the remainder of the week.  CM was instructed to call Jacklyn Aguirre at [phone], ext. [phone].    CM left her a detailed VM with call back information.     CM/SW will follow-up on referral and provide any additional documentation necessary to facilitate placement.     Electronically signed by JAMIE Toro on 8/21/2024 at 12:56 PM     UPDATE:    Patient informed CM that he no longer wanted to go to the [facility] due to having a friend tell [facility] that [facility]. Patient would now like to go home with homecare.  CM called Jen and is setting up [facility].     CM team following.    Electronically signed by Vida Sykes RN on 8/21/2024 at 2:02 PM

## 2024-08-21 NOTE — CARE COORDINATION
Discharge Planning:     (CM) called Melissa at [facility]. CM left a detailed VM.  CM waiting on return all.     Electronically signed by JAMIE Toro on 8/21/2024 at 4:34 PM

## 2024-08-21 NOTE — PROGRESS NOTES
Patient's HS blood glucose is 365. This RN notified Linette Hill NP via perfect serve. No new orders.

## 2024-08-21 NOTE — PLAN OF CARE
Problem: Discharge Planning  Goal: Discharge to home or other facility with appropriate resources  Outcome: Progressing  Flowsheets (Taken 8/21/2024 0233)  Discharge to home or other facility with appropriate resources: Identify barriers to discharge with patient and caregiver     Problem: Pain  Goal: Verbalizes/displays adequate comfort level or baseline comfort level  Outcome: Progressing  Flowsheets (Taken 8/21/2024 0233)  Verbalizes/displays adequate comfort level or baseline comfort level:   Encourage patient to monitor pain and request assistance   Assess pain using appropriate pain scale   Administer analgesics based on type and severity of pain and evaluate response   Implement non-pharmacological measures as appropriate and evaluate response     Problem: Safety - Adult  Goal: Free from fall injury  Outcome: Progressing  Flowsheets (Taken 8/21/2024 0233)  Free From Fall Injury:   Based on caregiver fall risk screen, instruct family/caregiver to ask for assistance with transferring infant if caregiver noted to have fall risk factors   Instruct family/caregiver on patient safety     Problem: Skin/Tissue Integrity  Goal: Absence of new skin breakdown  Description: 1.  Monitor for areas of redness and/or skin breakdown  2.  Assess vascular access sites hourly  3.  Every 4-6 hours minimum:  Change oxygen saturation probe site  4.  Every 4-6 hours:  If on nasal continuous positive airway pressure, respiratory therapy assess nares and determine need for appliance change or resting period.  Outcome: Progressing     Problem: ABCDS Injury Assessment  Goal: Absence of physical injury  Outcome: Progressing  Flowsheets (Taken 8/21/2024 0233)  Absence of Physical Injury: Implement safety measures based on patient assessment     Problem: Chronic Conditions and Co-morbidities  Goal: Patient's chronic conditions and co-morbidity symptoms are monitored and maintained or improved  Outcome: Progressing  Flowsheets (Taken

## 2024-08-21 NOTE — DISCHARGE INSTR - COC
Continuity of Care Form    Patient Name: Gamal Blue   :  1950  MRN:  [id number]    Admit date:  2024  Discharge date:      Code Status Order: Full Code   Advance Directives:   Advance Care Flowsheet Documentation             Admitting Physician:  Monica Lutz DO  PCP: Clinton Kerns MD    Discharging Nurse:   Discharging Hospital Unit/Room#: 3TN-3381/3381-01  Discharging Unit Phone Number: [phone]    Emergency Contact:   Extended Emergency Contact Information  Primary Emergency Contact: Vida Blue  Mobile Phone: [phone]  Relation: Spouse    Past Surgical History:  Past Surgical History:   Procedure Laterality Date    APPENDECTOMY         Immunization History:     There is no immunization history on file for this patient.    Active Problems:  Patient Active Problem List   Diagnosis Code    Sepsis due to cellulitis (HCC) L03.90, A41.9    COPD exacerbation (HCC) J44.1    Acute on chronic respiratory failure with hypoxia (HCC) J96.21    Acute kidney injury superimposed on chronic kidney disease (HCC) N17.9, N18.9    Elevated lactic acid level R79.89    Elevated brain natriuretic peptide (BNP) level R79.89    Hypomagnesemia E83.42    Hypophosphatemia E83.39    Type 2 diabetes mellitus (HCC) E11.9    Obesity with sleep apnea G47.30, E66.9    CAD (coronary artery disease) I25.10    Septicemia (HCC) A41.9    Elevated sed rate R70.0    CRP elevated R79.82    Cellulitis of left leg L03.116    Lactic acidosis E87.20       Isolation/Infection:   Isolation            No Isolation          Patient Infection Status       None to display                     Nurse Assessment:  Last Vital Signs: BP (!) 117/53   Pulse 81   Temp 97.3 °F (36.3 °C) (Oral)   Resp 18   Ht 1.727 m (5' 8\")   Wt 120.3 kg (265 lb 4.8 oz)   SpO2 92%   BMI 40.34 kg/m²     Last documented pain score (0-10 scale): Pain Level: 9  Last Weight:   Wt Readings from Last 1 Encounters:   24 120.3 kg (265 lb 4.8 oz)

## 2024-08-21 NOTE — PROGRESS NOTES
Admit Date: 8/18/2024  Diet: ADULT DIET; Regular; 4 carb choices (60 gm/meal)    CC: LE cellulitis    Interval history:   Overnight, there were no acute events.  Patient's vitals remained stable.    Patient was seen this morning. I discussed the plan of the day with him in detail. All questions were addressed and answered to patient's satisfaction. Patient denies fevers, chills, nausea, vomiting, chest pain, shortness of breath, diarrhea, constipation, dysuria, urinary frequency or urgency.     Plan:     -Continue IV daptomycin and cefepime  -Probiotics  -Wean off O2 as tolerated  -Treat and manage hyperglycemia in light of steroids    Assessment:   Gamal Blue is a 73 y.o. male with PMH of COPD, type 2 diabetes, hypertension, VTE, CAD, obesity with sleep apnea on CPAP, hyperlipidemia, stage III CKD with baseline creatinine of 1.6-1.7  who was admitted with LE Cellulitis      Sepsis due to cellulitis (HCC)    Left lower extremity cellulitis  Left lower extremity with erythema over the shin as well as the foot with disclamation of the skin on the toes, fluctuance noted.  Concerning for abscess.  - ID consult  - Podiatry consult  - Pain management       COPD exacerbation (HCC)    Acute on chronic respiratory failure with hypoxia (HCC)  On presentation, patient had desaturations of 85% on room air.  At baseline he reports not to be on oxygen and uses BiPAP at night.  - Will continue breathing therapy with cardiopulmonary protocol  - Continue systemic steroids  - O2 supplementation with target sats greater than 90%       Acute kidney injury superimposed on chronic kidney disease (HCC): In the setting of sepsis.  Baseline creatinine around 1.6.  Peak creatinine at 2.2.  Improved with IV hydration.  - Stop IV fluids and monitor off fluids  - Monitor renal function and electrolytes    Type 2 diabetes mellitus (HCC): On sliding scale insulin with glargine.  Closely monitor glycemic levels with adjustment on  08/20/24  0842 08/21/24  0649   WBC 14.8* 14.8* 12.1*   HGB 10.3* 10.1* 9.9*   HCT 31.5* 31.8* 31.3*    203 198   MCV 75.9* 76.6* 75.9*     Renal:    Recent Labs     08/18/24 2037 08/19/24  0443 08/20/24  0842 08/21/24  0649   * 135* 127* 126*   K 3.8 3.7 4.3 4.5    103 97* 97*   CO2 18* 20* 18* 18*   BUN 25* 20 27* 29*   CREATININE 2.2* 1.8* 1.4* 1.3   GLUCOSE 291* 186* 396* 334*   CALCIUM 8.7 8.1* 8.0* 7.9*   MG 1.70*  --  2.50* 2.50*   PHOS 2.0* 3.6  --   --    ANIONGAP 14 12 12 11     Hepatic:   Recent Labs     08/18/24 2037 08/20/24  0842 08/21/24  0649   AST 11* 16 11*   ALT 7* 12 11   BILITOT 0.4 <0.2 <0.2   ALKPHOS 73 62 62     Troponin: No results for input(s): \"TROPONINI\" in the last 72 hours.  BNP: No results for input(s): \"BNP\" in the last 72 hours.  Lipids: No results for input(s): \"CHOL\", \"HDL\" in the last 72 hours.    Invalid input(s): \"LDLCALCU\", \"TRIGLYCERIDE\"  ABGs:  No results for input(s): \"PHART\", \"DIS7RLN\", \"PO2ART\", \"NHB6CJS\", \"BEART\", \"THGBART\", \"T9EUCMTB\", \"YFF8DRQ\" in the last 72 hours.    INR:   Recent Labs     08/18/24 2037   INR 1.45*     Lactate: No results for input(s): \"LACTATE\" in the last 72 hours.  Cultures:  -----------------------------------------------------------------  RAD:   MRI FOOT LEFT WO CONTRAST   Final Result   1.  No evidence of osteomyelitis or abscess.      2.  Significant soft tissue swelling over the dorsum of the foot         CT CHEST PULMONARY EMBOLISM W CONTRAST   Final Result   No pulmonary embolism.  Emphysema without acute pulmonary abnormality.         CT HEAD WO CONTRAST   Final Result   No acute intracranial abnormality is identified.         XR CHEST PORTABLE   Final Result   Bilateral patchy interstitial opacities, concerning for pulmonary edema or   multifocal pneumonia.             Medications:     Scheduled Meds:   insulin lispro  8 Units SubCUTAneous TID WC    insulin regular  5 Units IntraVENous Once    insulin lispro  0-16  Units SubCUTAneous TID WC    insulin lispro  0-4 Units SubCUTAneous Nightly    pantoprazole  40 mg IntraVENous Daily    lactobacillus  1 capsule Oral BID WC    albuterol  2.5 mg Nebulization 4x Daily RT    mupirocin   Topical Daily    cefepime  2,000 mg IntraVENous Q12H    apixaban  5 mg Oral BID    [Held by provider] atorvastatin  20 mg Oral Daily    budesonide-formoterol  2 puff Inhalation BID    clopidogrel  75 mg Oral Daily    tamsulosin  0.4 mg Oral Daily    sodium chloride flush  5-40 mL IntraVENous 2 times per day    nicotine  1 patch TransDERmal Daily    DAPTOmycin (CUBICIN) 550 mg in sodium chloride 0.9 % 50 mL IVPB  6 mg/kg (Adjusted) IntraVENous Q24H    gabapentin  300 mg Oral TID    insulin glargine  60 Units SubCUTAneous Nightly     Continuous Infusions:   sodium chloride 25 mL (08/21/24 1147)    dextrose       PRN Meds:albuterol, labetalol, sodium chloride flush, sodium chloride, magnesium sulfate, ondansetron **OR** ondansetron, polyethylene glycol, acetaminophen **OR** acetaminophen, sodium phosphate 15 mmol in sodium chloride 0.9 % 250 mL IVPB, dextrose bolus **OR** dextrose bolus, glucagon (rDNA), dextrose, traMADol

## 2024-08-22 LAB
ALBUMIN SERPL-MCNC: 2.7 G/DL (ref 3.4–5)
ALBUMIN/GLOB SERPL: 0.7 {RATIO} (ref 1.1–2.2)
ALP SERPL-CCNC: 59 U/L (ref 40–129)
ALT SERPL-CCNC: 11 U/L (ref 10–40)
ANION GAP SERPL CALCULATED.3IONS-SCNC: 9 MMOL/L (ref 3–16)
AST SERPL-CCNC: 11 U/L (ref 15–37)
BACTERIA BLD CULT ORG #2: NORMAL
BACTERIA BLD CULT: NORMAL
BACTERIA SPEC AEROBE CULT: ABNORMAL
BASOPHILS # BLD: 0 K/UL (ref 0–0.2)
BASOPHILS NFR BLD: 0.1 %
BILIRUB SERPL-MCNC: <0.2 MG/DL (ref 0–1)
BILIRUB UR QL STRIP.AUTO: NEGATIVE
BUN SERPL-MCNC: 34 MG/DL (ref 7–20)
CALCIUM SERPL-MCNC: 8.1 MG/DL (ref 8.3–10.6)
CHLORIDE SERPL-SCNC: 103 MMOL/L (ref 99–110)
CLARITY UR: CLEAR
CO2 SERPL-SCNC: 21 MMOL/L (ref 21–32)
COLOR UR: YELLOW
CREAT SERPL-MCNC: 1.5 MG/DL (ref 0.8–1.3)
DEPRECATED RDW RBC AUTO: 17.3 % (ref 12.4–15.4)
EOSINOPHIL # BLD: 0 K/UL (ref 0–0.6)
EOSINOPHIL NFR BLD: 0.3 %
EST. AVERAGE GLUCOSE BLD GHB EST-MCNC: 171.4 MG/DL
GFR SERPLBLD CREATININE-BSD FMLA CKD-EPI: 49 ML/MIN/{1.73_M2}
GLUCOSE BLD-MCNC: 109 MG/DL (ref 70–99)
GLUCOSE BLD-MCNC: 126 MG/DL (ref 70–99)
GLUCOSE BLD-MCNC: 134 MG/DL (ref 70–99)
GLUCOSE BLD-MCNC: 176 MG/DL (ref 70–99)
GLUCOSE SERPL-MCNC: 188 MG/DL (ref 70–99)
GLUCOSE UR STRIP.AUTO-MCNC: NEGATIVE MG/DL
GRAM STN SPEC: ABNORMAL
HBA1C MFR BLD: 7.6 %
HCT VFR BLD AUTO: 29 % (ref 40.5–52.5)
HGB BLD-MCNC: 9.6 G/DL (ref 13.5–17.5)
HGB UR QL STRIP.AUTO: NEGATIVE
KETONES UR STRIP.AUTO-MCNC: NEGATIVE MG/DL
LEUKOCYTE ESTERASE UR QL STRIP.AUTO: NEGATIVE
LYMPHOCYTES # BLD: 2 K/UL (ref 1–5.1)
LYMPHOCYTES NFR BLD: 23.7 %
MAGNESIUM SERPL-MCNC: 2.4 MG/DL (ref 1.8–2.4)
MCH RBC QN AUTO: 25.1 PG (ref 26–34)
MCHC RBC AUTO-ENTMCNC: 33.2 G/DL (ref 31–36)
MCV RBC AUTO: 75.8 FL (ref 80–100)
MONOCYTES # BLD: 0.6 K/UL (ref 0–1.3)
MONOCYTES NFR BLD: 6.8 %
NEUTROPHILS # BLD: 5.8 K/UL (ref 1.7–7.7)
NEUTROPHILS NFR BLD: 69.1 %
NITRITE UR QL STRIP.AUTO: NEGATIVE
ORGANISM: ABNORMAL
PERFORMED ON: ABNORMAL
PH UR STRIP.AUTO: 6 [PH] (ref 5–8)
PLATELET # BLD AUTO: 192 K/UL (ref 135–450)
PMV BLD AUTO: 7.6 FL (ref 5–10.5)
POTASSIUM SERPL-SCNC: 4.1 MMOL/L (ref 3.5–5.1)
PROT SERPL-MCNC: 6.5 G/DL (ref 6.4–8.2)
PROT UR STRIP.AUTO-MCNC: NEGATIVE MG/DL
RBC # BLD AUTO: 3.83 M/UL (ref 4.2–5.9)
SODIUM SERPL-SCNC: 133 MMOL/L (ref 136–145)
SP GR UR STRIP.AUTO: 1.01 (ref 1–1.03)
UA DIPSTICK W REFLEX MICRO PNL UR: NORMAL
URN SPEC COLLECT METH UR: NORMAL
UROBILINOGEN UR STRIP-ACNC: 0.2 E.U./DL
WBC # BLD AUTO: 8.4 K/UL (ref 4–11)

## 2024-08-22 PROCEDURE — 6370000000 HC RX 637 (ALT 250 FOR IP): Performed by: STUDENT IN AN ORGANIZED HEALTH CARE EDUCATION/TRAINING PROGRAM

## 2024-08-22 PROCEDURE — 99233 SBSQ HOSP IP/OBS HIGH 50: CPT | Performed by: INTERNAL MEDICINE

## 2024-08-22 PROCEDURE — 97530 THERAPEUTIC ACTIVITIES: CPT

## 2024-08-22 PROCEDURE — 6370000000 HC RX 637 (ALT 250 FOR IP): Performed by: INTERNAL MEDICINE

## 2024-08-22 PROCEDURE — 2580000003 HC RX 258: Performed by: NURSE PRACTITIONER

## 2024-08-22 PROCEDURE — C1751 CATH, INF, PER/CENT/MIDLINE: HCPCS

## 2024-08-22 PROCEDURE — 2700000000 HC OXYGEN THERAPY PER DAY

## 2024-08-22 PROCEDURE — 6370000000 HC RX 637 (ALT 250 FOR IP): Performed by: NURSE PRACTITIONER

## 2024-08-22 PROCEDURE — 80053 COMPREHEN METABOLIC PANEL: CPT

## 2024-08-22 PROCEDURE — 81003 URINALYSIS AUTO W/O SCOPE: CPT

## 2024-08-22 PROCEDURE — 2580000003 HC RX 258: Performed by: INTERNAL MEDICINE

## 2024-08-22 PROCEDURE — 85025 COMPLETE CBC W/AUTO DIFF WBC: CPT

## 2024-08-22 PROCEDURE — 2060000000 HC ICU INTERMEDIATE R&B

## 2024-08-22 PROCEDURE — 6360000002 HC RX W HCPCS: Performed by: STUDENT IN AN ORGANIZED HEALTH CARE EDUCATION/TRAINING PROGRAM

## 2024-08-22 PROCEDURE — 36415 COLL VENOUS BLD VENIPUNCTURE: CPT

## 2024-08-22 PROCEDURE — 94761 N-INVAS EAR/PLS OXIMETRY MLT: CPT

## 2024-08-22 PROCEDURE — 05HC33Z INSERTION OF INFUSION DEVICE INTO LEFT BASILIC VEIN, PERCUTANEOUS APPROACH: ICD-10-PCS | Performed by: INTERNAL MEDICINE

## 2024-08-22 PROCEDURE — 94640 AIRWAY INHALATION TREATMENT: CPT

## 2024-08-22 PROCEDURE — 6360000002 HC RX W HCPCS: Performed by: NURSE PRACTITIONER

## 2024-08-22 PROCEDURE — 97110 THERAPEUTIC EXERCISES: CPT

## 2024-08-22 PROCEDURE — 6360000002 HC RX W HCPCS: Performed by: INTERNAL MEDICINE

## 2024-08-22 PROCEDURE — 36410 VNPNXR 3YR/> PHY/QHP DX/THER: CPT

## 2024-08-22 PROCEDURE — 94660 CPAP INITIATION&MGMT: CPT

## 2024-08-22 PROCEDURE — 83735 ASSAY OF MAGNESIUM: CPT

## 2024-08-22 RX ORDER — LIDOCAINE HYDROCHLORIDE 10 MG/ML
50 INJECTION, SOLUTION EPIDURAL; INFILTRATION; INTRACAUDAL; PERINEURAL ONCE
Status: DISCONTINUED | OUTPATIENT
Start: 2024-08-22 | End: 2024-08-27 | Stop reason: HOSPADM

## 2024-08-22 RX ORDER — SODIUM CHLORIDE 0.9 % (FLUSH) 0.9 %
5-40 SYRINGE (ML) INJECTION EVERY 12 HOURS SCHEDULED
Status: DISCONTINUED | OUTPATIENT
Start: 2024-08-22 | End: 2024-08-27 | Stop reason: HOSPADM

## 2024-08-22 RX ORDER — SODIUM CHLORIDE 9 MG/ML
INJECTION, SOLUTION INTRAVENOUS PRN
Status: DISCONTINUED | OUTPATIENT
Start: 2024-08-22 | End: 2024-08-27 | Stop reason: HOSPADM

## 2024-08-22 RX ORDER — SODIUM CHLORIDE 0.9 % (FLUSH) 0.9 %
5-40 SYRINGE (ML) INJECTION PRN
Status: DISCONTINUED | OUTPATIENT
Start: 2024-08-22 | End: 2024-08-27 | Stop reason: HOSPADM

## 2024-08-22 RX ADMIN — PANTOPRAZOLE SODIUM 40 MG: 40 INJECTION, POWDER, FOR SOLUTION INTRAVENOUS at 08:31

## 2024-08-22 RX ADMIN — GABAPENTIN 300 MG: 300 CAPSULE ORAL at 14:09

## 2024-08-22 RX ADMIN — TRAMADOL HYDROCHLORIDE 50 MG: 50 TABLET ORAL at 22:36

## 2024-08-22 RX ADMIN — BUDESONIDE AND FORMOTEROL FUMARATE DIHYDRATE 2 PUFF: 160; 4.5 AEROSOL RESPIRATORY (INHALATION) at 23:13

## 2024-08-22 RX ADMIN — Medication 10 ML: at 23:11

## 2024-08-22 RX ADMIN — APIXABAN 5 MG: 5 TABLET, FILM COATED ORAL at 22:36

## 2024-08-22 RX ADMIN — ALBUTEROL SULFATE 2.5 MG: 2.5 SOLUTION RESPIRATORY (INHALATION) at 23:13

## 2024-08-22 RX ADMIN — Medication 1 CAPSULE: at 08:26

## 2024-08-22 RX ADMIN — MUPIROCIN: 20 OINTMENT TOPICAL at 09:50

## 2024-08-22 RX ADMIN — CEFEPIME 2000 MG: 2 INJECTION, POWDER, FOR SOLUTION INTRAVENOUS at 09:49

## 2024-08-22 RX ADMIN — BUDESONIDE AND FORMOTEROL FUMARATE DIHYDRATE 2 PUFF: 160; 4.5 AEROSOL RESPIRATORY (INHALATION) at 09:39

## 2024-08-22 RX ADMIN — PIPERACILLIN AND TAZOBACTAM 3375 MG: 3; .375 INJECTION, POWDER, LYOPHILIZED, FOR SOLUTION INTRAVENOUS at 14:41

## 2024-08-22 RX ADMIN — ALBUTEROL SULFATE 2.5 MG: 2.5 SOLUTION RESPIRATORY (INHALATION) at 15:48

## 2024-08-22 RX ADMIN — ALBUTEROL SULFATE 2.5 MG: 2.5 SOLUTION RESPIRATORY (INHALATION) at 09:38

## 2024-08-22 RX ADMIN — ACETAMINOPHEN 650 MG: 325 TABLET ORAL at 17:49

## 2024-08-22 RX ADMIN — GABAPENTIN 300 MG: 300 CAPSULE ORAL at 08:26

## 2024-08-22 RX ADMIN — GABAPENTIN 300 MG: 300 CAPSULE ORAL at 22:36

## 2024-08-22 RX ADMIN — INSULIN LISPRO 8 UNITS: 100 INJECTION, SOLUTION INTRAVENOUS; SUBCUTANEOUS at 08:29

## 2024-08-22 RX ADMIN — PIPERACILLIN AND TAZOBACTAM 3375 MG: 3; .375 INJECTION, POWDER, LYOPHILIZED, FOR SOLUTION INTRAVENOUS at 23:10

## 2024-08-22 RX ADMIN — Medication 1 CAPSULE: at 17:46

## 2024-08-22 RX ADMIN — ATORVASTATIN CALCIUM 20 MG: 20 TABLET, FILM COATED ORAL at 08:27

## 2024-08-22 RX ADMIN — TAMSULOSIN HYDROCHLORIDE 0.4 MG: 0.4 CAPSULE ORAL at 08:26

## 2024-08-22 RX ADMIN — TRAMADOL HYDROCHLORIDE 50 MG: 50 TABLET ORAL at 14:09

## 2024-08-22 RX ADMIN — CLOPIDOGREL BISULFATE 75 MG: 75 TABLET ORAL at 08:26

## 2024-08-22 RX ADMIN — INSULIN GLARGINE 60 UNITS: 100 INJECTION, SOLUTION SUBCUTANEOUS at 22:36

## 2024-08-22 RX ADMIN — APIXABAN 5 MG: 5 TABLET, FILM COATED ORAL at 08:26

## 2024-08-22 ASSESSMENT — PAIN DESCRIPTION - ORIENTATION
ORIENTATION: LEFT

## 2024-08-22 ASSESSMENT — PAIN DESCRIPTION - LOCATION
LOCATION: LEG
LOCATION: FOOT

## 2024-08-22 ASSESSMENT — PAIN SCALES - GENERAL
PAINLEVEL_OUTOF10: 8
PAINLEVEL_OUTOF10: 8
PAINLEVEL_OUTOF10: 9
PAINLEVEL_OUTOF10: 8

## 2024-08-22 NOTE — CARE COORDINATION
Discharge Planning Note    confirmed with Melissa dacosta [facility] that they can accept patient and provide home care services.  to follow for home care.  Electronically signed by DESTINY Manley on 8/22/2024 at 2:56 PM  Phone [phone]

## 2024-08-22 NOTE — RT PROTOCOL NOTE
RT Inhaler-Nebulizer Bronchodilator Protocol Note    There is a bronchodilator order in the chart from a provider indicating to follow the RT Bronchodilator Protocol and there is an “Initiate RT Inhaler-Nebulizer Bronchodilator Protocol” order as well (see protocol at bottom of note).    CXR Findings:  No results found.    The findings from the last RT Protocol Assessment were as follows:   History Pulmonary Disease: Chronic pulmonary disease  Respiratory Pattern: Dyspnea on exertion or RR 21-25 bpm  Breath Sounds: Intermittent or unilateral wheezes  Cough: Weak, non-productive  Indication for Bronchodilator Therapy: On home bronchodilators  Bronchodilator Assessment Score: 11    Aerosolized bronchodilator medication orders have been revised according to the RT Inhaler-Nebulizer Bronchodilator Protocol below.    Respiratory Therapist to perform RT Therapy Protocol Assessment initially then follow the protocol.  Repeat RT Therapy Protocol Assessment PRN for score 0-3 or on second treatment, BID, and PRN for scores above 3.    No Indications - adjust the frequency to every 6 hours PRN wheezing or bronchospasm, if no treatments needed after 48 hours then discontinue using Per Protocol order mode.     If indication present, adjust the RT bronchodilator orders based on the Bronchodilator Assessment Score as indicated below.  Use Inhaler orders unless patient has one or more of the following: on home nebulizer, not able to hold breath for 10 seconds, is not alert and oriented, cannot activate and use MDI correctly, or respiratory rate 25 breaths per minute or more, then use the equivalent nebulizer order(s) with same Frequency and PRN reasons based on the score.  If a patient is on this medication at home then do not decrease Frequency below that used at home.    0-3 - enter or revise RT bronchodilator order(s) to equivalent RT Bronchodilator order with Frequency of every 4 hours PRN for wheezing or increased work of

## 2024-08-22 NOTE — CONSULTS
POWER MIDLINE PROCEDURE NOTE  Chart reviewed for allergies, diagnosis, labs, known contraindications, reason for line placement and planned length of treatment.  Insertion procedure discussed with patient/family member.  Informed consent not required for Midline placement.  Three patient identifiers - Patient name,   and MRN -  completed &  confirmed verbally.   Hat, mask and eye shield donned.  Midline site scrubbed with Chloraprep  One-Step applicator  for 30 seconds x 1.   Hand Hygiene  performed with 3% Chlorhexidine surgical scrub x1 min prior to  Sterile gloves, sterile gown being donned.  Patient draped using maximal sterile barrier technique ( head to toe ).  Midline site scrubbed a 2nd time with Chloraprep One-Step applicator x 30 sec. Vein located by Ultra Sound and site marked with sterile pen.  1% Lidocaine 5 ml injected intradermal pre-insertion for trimmed Midline.  Midline inserted.  Positive brisk blood return obtained.  Valve applied to lumen.  Midline flushed with 10 mls  0.9% Sterile Sodium Chloride. Midline flushes easily with no resistance.   Skin prep applied to site. Catheter secured with non-sutured locking device per hospital protocol.  Bio-patch/CHG impregnated sterile tegaderm dressing applied. Alcohol Swab Caps applied to valve.   Sterile field maintained during procedure. Positioning wire accounted for post procedure. Pt. Response to procedure, tolerated well. Appearance of site: Clean dry and intact without bleeding or edema. All edges of Tegaderm occlusive.   Site marked with date and initials of RN placing line.Top 2 side rails in up position, call button in reach, RN notified of all of the above.   A Power Midline CM placed in the ROBERTO  Basilic vein.0 cm showing from insertion site.

## 2024-08-22 NOTE — PLAN OF CARE
Problem: Discharge Planning  Goal: Discharge to home or other facility with appropriate resources  Outcome: Progressing  Flowsheets (Taken 8/21/2024 2017 by Arnaldo Charles RN)  Discharge to home or other facility with appropriate resources: Identify barriers to discharge with patient and caregiver     Problem: Pain  Goal: Verbalizes/displays adequate comfort level or baseline comfort level  8/22/2024 0836 by Batsheva Lobo RN  Outcome: Progressing  Flowsheets (Taken 8/22/2024 0815)  Verbalizes/displays adequate comfort level or baseline comfort level: Encourage patient to monitor pain and request assistance  8/22/2024 0452 by Arnaldo Charles RN  Outcome: Progressing     Problem: Safety - Adult  Goal: Free from fall injury  8/22/2024 0836 by Batsheva Lobo RN  Outcome: Progressing  8/22/2024 0452 by Arnaldo Charles RN  Outcome: Progressing     Problem: Skin/Tissue Integrity  Goal: Absence of new skin breakdown  Description: 1.  Monitor for areas of redness and/or skin breakdown  2.  Assess vascular access sites hourly  3.  Every 4-6 hours minimum:  Change oxygen saturation probe site  4.  Every 4-6 hours:  If on nasal continuous positive airway pressure, respiratory therapy assess nares and determine need for appliance change or resting period.  Outcome: Progressing     Problem: ABCDS Injury Assessment  Goal: Absence of physical injury  Outcome: Progressing     Problem: Chronic Conditions and Co-morbidities  Goal: Patient's chronic conditions and co-morbidity symptoms are monitored and maintained or improved  8/22/2024 0836 by Batsheva Lobo RN  Outcome: Progressing  8/22/2024 0452 by Arnaldo Charles RN  Outcome: Progressing  Flowsheets (Taken 8/21/2024 2017)  Care Plan - Patient's Chronic Conditions and Co-Morbidity Symptoms are Monitored and Maintained or Improved: Monitor and assess patient's chronic conditions and comorbid symptoms for stability, deterioration, or improvement     Problem: Respiratory -  Adult  Goal: Achieves optimal ventilation and oxygenation  Outcome: Progressing  Flowsheets (Taken 8/21/2024 2017 by Arnaldo Charles, RN)  Achieves optimal ventilation and oxygenation: Assess for changes in respiratory status     Problem: Skin/Tissue Integrity - Adult  Goal: Skin integrity remains intact  Outcome: Progressing  Flowsheets (Taken 8/21/2024 2017 by Arnaldo Charles, RN)  Skin Integrity Remains Intact: Monitor for areas of redness and/or skin breakdown  Goal: Incisions, wounds, or drain sites healing without S/S of infection  Outcome: Progressing  Flowsheets (Taken 8/21/2024 2017 by Arnaldo Charles, RN)  Incisions, Wounds, or Drain Sites Healing Without Sign and Symptoms of Infection: TWICE DAILY: Assess and document skin integrity     Problem: Musculoskeletal - Adult  Goal: Return mobility to safest level of function  Outcome: Progressing  Flowsheets (Taken 8/21/2024 2017 by Arnaldo Charles, RN)  Return Mobility to Safest Level of Function: Assess patient stability and activity tolerance for standing, transferring and ambulating with or without assistive devices  Goal: Return ADL status to a safe level of function  Outcome: Progressing  Flowsheets (Taken 8/21/2024 2017 by Arnaldo Charles, RN)  Return ADL Status to a Safe Level of Function: Administer medication as ordered

## 2024-08-22 NOTE — PROGRESS NOTES
Admit Date: 8/18/2024  Diet: ADULT DIET; Regular; 3 carb choices (45 gm/meal)    CC: LE cellulitis    Interval history:   Overnight, there were no acute events.  Patient's vitals remained stable.    Patient was seen this morning. I discussed the plan of the day with him in detail. All questions were addressed and answered to patient's satisfaction. Patient denies fevers, chills, nausea, vomiting, chest pain, shortness of breath, diarrhea, constipation, dysuria, urinary frequency or urgency.     Plan:     -Continue IV cefepime  -Probiotics  -PT/OT  -Will await final antibiotic recommendations from ID    Assessment:   Gamal Blue is a 73 y.o. male with PMH of COPD, type 2 diabetes, hypertension, VTE, CAD, obesity with sleep apnea on CPAP, hyperlipidemia, stage III CKD with baseline creatinine of 1.6-1.7  who was admitted with LE Cellulitis      Sepsis due to cellulitis (HCC)    Left lower extremity cellulitis  Left lower extremity with erythema over the shin as well as the foot with disclamation of the skin on the toes, fluctuance noted.  Concerning for abscess.  - ID consult  - Podiatry consult  - Pain management       COPD exacerbation (HCC)    Acute on chronic respiratory failure with hypoxia (HCC)  On presentation, patient had desaturations of 85% on room air.  At baseline he reports not to be on oxygen and uses BiPAP at night.  - Will continue breathing therapy with cardiopulmonary protocol  - Continue systemic steroids  - O2 supplementation with target sats greater than 90%       Acute kidney injury superimposed on chronic kidney disease (HCC): In the setting of sepsis.  Baseline creatinine around 1.6.  Peak creatinine at 2.2.  Improved with IV hydration.  - Stop IV fluids and monitor off fluids  - Monitor renal function and electrolytes    Type 2 diabetes mellitus (HCC): On sliding scale insulin with glargine.  Closely monitor glycemic levels with adjustment on steroids      Obesity with sleep apnea:

## 2024-08-22 NOTE — PROGRESS NOTES
Infectious Diseases   Progress Note      Admission Date: 8/18/2024  Hospital Day: Hospital Day: 5   Attending: Santana Tabor MD  Date of service: 8/22/2024     Chief complaint/ Reason for consult:     Sepsis with fever of 100.8, leukocytosis, tachycardia and tachypnea  Left lower extremity cellulitis  Complicated left diabetic foot infection  Poorly controlled type 2 diabetes mellitus  Failure of outpatient antibiotics  Acute kidney injury    Microbiology:        I have reviewed allavailable micro lab data and cultures    Results       Procedure Component Value Units Date/Time    Culture, Wound [[id number]]  (Abnormal)  (Susceptibility) Collected: 08/19/24 1700    Order Status: Completed Specimen: Drainage from Foot Updated: 08/22/24 0556     Gram Stain Result  2+ Epithelial Cells   1+ Gram positive cocci       Organism Corynebacterium striatum     WOUND/ABSCESS --     Moderate growth  No further workup       Organism Alcaligenes faecalis ssp faecalis     WOUND/ABSCESS Light growth     Organism Beta Strep Group G     WOUND/ABSCESS --     Light growth  Susceptibility testing of penicillin and other beta lactams is  not necessary for beta hemolytic Streptococci since resistant  strains have not been identified. (CLSI M100)      Narrative:      ORDER#: [id number]                          ORDERED BY: PATRICIO HERZOG  SOURCE: Foot Left foot                     COLLECTED:  08/19/24 17:00  ANTIBIOTICS AT [facility].:                      RECEIVED :  08/19/24 19:20    Susceptibility        Alcaligenes faecalis ssp faecalis      BACTERIAL SUSCEPTIBILITY PANEL BY CRISS      cefepime 16 mcg/mL Intermediate      cefTRIAXone <=1 mcg/mL Sensitive      ciprofloxacin 2 mcg/mL Intermediate      gentamicin <=2 mcg/mL Resistant      levofloxacin <=0.5 mcg/mL Sensitive      meropenem <=1 mcg/mL Sensitive      piperacillin-tazobactam <=8 mcg/mL Sensitive      tobramycin 4 mcg/mL Sensitive      trimethoprim-sulfamethoxazole <=0.5/9.5 mcg/mL  31.7 31.8 33.2   RDW 17.2* 17.4* 17.3*        BMP:  Recent Labs     08/20/24  0842 08/21/24  0649 08/22/24  0504   * 126* 133*   K 4.3 4.5 4.1   CL 97* 97* 103   CO2 18* 18* 21   BUN 27* 29* 34*   CREATININE 1.4* 1.3 1.5*   CALCIUM 8.0* 7.9* 8.1*   GLUCOSE 396* 334* 188*        Hepatic Function Panel:   Lab Results   Component Value Date/Time    ALKPHOS 59 08/22/2024 05:04 AM    ALT 11 08/22/2024 05:04 AM    AST 11 08/22/2024 05:04 AM    BILITOT <0.2 08/22/2024 05:04 AM       CPK:   Lab Results   Component Value Date    CKTOTAL 300 08/19/2024     ESR:   Lab Results   Component Value Date    SEDRATE 62 (H) 08/19/2024     CRP:   Lab Results   Component Value Date    .4 (H) 08/19/2024           Imaging:    All pertinent images and reports for the current visit were reviewed by me during this visit.  I reviewed the chest x-ray/CT scan/MRI images and independently interpreted the findings and results today.    MRI FOOT LEFT WO CONTRAST   Final Result   1.  No evidence of osteomyelitis or abscess.      2.  Significant soft tissue swelling over the dorsum of the foot         CT CHEST PULMONARY EMBOLISM W CONTRAST   Final Result   No pulmonary embolism.  Emphysema without acute pulmonary abnormality.         CT HEAD WO CONTRAST   Final Result   No acute intracranial abnormality is identified.         XR CHEST PORTABLE   Final Result   Bilateral patchy interstitial opacities, concerning for pulmonary edema or   multifocal pneumonia.             Medications: All current and past medications were reviewed.     piperacillin-tazobactam  3,375 mg IntraVENous Q8H    sodium chloride flush  5-40 mL IntraVENous 2 times per day    lidocaine 1 % injection  50 mg IntraDERmal Once    insulin lispro  8 Units SubCUTAneous TID WC    insulin lispro  0-16 Units SubCUTAneous TID WC    insulin lispro  0-4 Units SubCUTAneous Nightly    pantoprazole  40 mg IntraVENous Daily    lactobacillus  1 capsule Oral BID WC    albuterol  2.5 mg  Nebulization 4x Daily RT    mupirocin   Topical Daily    apixaban  5 mg Oral BID    atorvastatin  20 mg Oral Daily    budesonide-formoterol  2 puff Inhalation BID    clopidogrel  75 mg Oral Daily    tamsulosin  0.4 mg Oral Daily    sodium chloride flush  5-40 mL IntraVENous 2 times per day    nicotine  1 patch TransDERmal Daily    gabapentin  300 mg Oral TID    insulin glargine  60 Units SubCUTAneous Nightly        sodium chloride      sodium chloride 25 mL (08/21/24 1147)    dextrose         sodium chloride flush, sodium chloride, albuterol, labetalol, sodium chloride flush, sodium chloride, magnesium sulfate, ondansetron **OR** ondansetron, polyethylene glycol, acetaminophen **OR** acetaminophen, sodium phosphate 15 mmol in sodium chloride 0.9 % 250 mL IVPB, dextrose bolus **OR** dextrose bolus, glucagon (rDNA), dextrose, traMADol      Problem list:       Patient Active Problem List   Diagnosis Code    Sepsis due to cellulitis (HCC) L03.90, A41.9    COPD exacerbation ([facility]) J44.1    Acute on chronic respiratory failure with hypoxia ([facility]) J96.21    Acute kidney injury superimposed on chronic kidney disease (HCC) N17.9, N18.9    Elevated lactic acid level R79.89    Elevated brain natriuretic peptide (BNP) level R79.89    Hypomagnesemia E83.42    Hypophosphatemia E83.39    Type 2 diabetes mellitus (HCC) E11.9    Obesity with sleep apnea G47.30, E66.9    CAD (coronary artery disease) I25.10    Septicemia (HCC) A41.9    Elevated sed rate R70.0    CRP elevated R79.82    Cellulitis of left leg L03.116    Lactic acidosis E87.20    Diabetes education, encounter for Z71.89    Gram-negative infection A49.9    Bacterial infection due to Streptococcus, group G B95.4       Please note that this chart was generated using Dragon dictation software. Although every effort was made to ensure the accuracy of this automated transcription, some errors in transcription may have occurred inadvertently. If you may need any clarification,

## 2024-08-22 NOTE — CONSULTS
Renal consult received  Full note to follow    Okay for midline placement in the dominant arm    Thank you for the consult

## 2024-08-22 NOTE — CONSULTS
[facility]  Consultation Note    Reason for Consult: BRYCE on CKD 3a  Requesting Physician:  Dr. STEVIE Tabor    CHIEF COMPLAINT:      History obtained from records and patient.    HISTORY OF PRESENT ILLNESS:                Gamal Blue  is 73 y.o. y.o. male with significant past medical history of CKD 3a, baseline creatinine 1.4-1.5, COPD, diabetes mellitus type 2, hyperlipidemia, hypertension, obstructive sleep apnea on CPAP, DVT, CAD who presents with recurrent falls and fatigue.  Admitted for left leg cellulitis and possible sepsis.  Was also hypoxic.  I am asked to see the patient since creatinine was 2.2 on presentation and now better at 1.5.  Planning for midline placement.  -2.8 L since admission.  Input not fully recorded.  Systolic blood pressure ranging from 90s to 130s.  CT with contrast done on 8/18/2024.  WBC was initially elevated but now better.  On statin and lisinopril as an outpatient.  Was also on Lasix 40 mg daily.  Also on metformin.  No vomiting nor diarrhea.  Good urine output.     Past Medical History:     has a past medical history of COPD (chronic obstructive pulmonary disease) (HCC), Diabetes mellitus (HCC), Hyperlipidemia, and Hypertension.   Past Surgical History:     has a past surgical history that includes Appendectomy.   Current Medications:    Current Facility-Administered Medications: piperacillin-tazobactam (ZOSYN) 3,375 mg in sodium chloride 0.9 % 50 mL IVPB (mini-bag), 3,375 mg, IntraVENous, Q8H  sodium chloride flush 0.9 % injection 5-40 mL, 5-40 mL, IntraVENous, 2 times per day  sodium chloride flush 0.9 % injection 5-40 mL, 5-40 mL, IntraVENous, PRN  0.9 % sodium chloride infusion, , IntraVENous, PRN  lidocaine PF 1 % injection 50 mg, 50 mg, IntraDERmal, Once  insulin lispro (HUMALOG,ADMELOG) injection vial 8 Units, 8 Units, SubCUTAneous, TID WC  insulin lispro (HUMALOG,ADMELOG) injection vial 0-16 Units, 0-16 Units, SubCUTAneous, TID WC  insulin  08/22/2024 05:04 AM    MCH 25.1 08/22/2024 05:04 AM    MCHC 33.2 08/22/2024 05:04 AM    RDW 17.3 08/22/2024 05:04 AM     08/22/2024 05:04 AM    MPV 7.6 08/22/2024 05:04 AM     BMP:   Lab Results   Component Value Date/Time     08/22/2024 05:04 AM    K 4.1 08/22/2024 05:04 AM    K 3.7 08/19/2024 04:43 AM     08/22/2024 05:04 AM    CO2 21 08/22/2024 05:04 AM    BUN 34 08/22/2024 05:04 AM    CREATININE 1.5 08/22/2024 05:04 AM    CALCIUM 8.1 08/22/2024 05:04 AM    GFRAA 40 01/01/2022 09:57 PM    LABGLOM 49 08/22/2024 05:04 AM    GLUCOSE 188 08/22/2024 05:04 AM   Magnesium:    Lab Results   Component Value Date/Time    MG 2.40 08/22/2024 05:04 AM   Phosphorus:    Lab Results   Component Value Date/Time    PHOS 3.6 08/19/2024 04:43 AM       Albumin 2.7      CT PE protocol    Impression:        No pulmonary embolism.  Emphysema without acute pulmonary abnormality.       IMPRESSION/RECOMMENDATIONS:    1.  BRYCE on CKD 3a- baseline creatinine 1.4-1.5.  BRYCE probably due to decreased renal perfusion in the setting of infection plus Lasix plus ACE inhibitor.  Now better.  Okay for midline placement on the right.  No need for further IVF.  Meds reviewed.  Continue PPI.  Can continue Gabapentin TID.   2.  Moderate hyponatremia-better  3.  Hypocalcemia-check ionized calcium  4.  History of hypertension-no need for aggressive control at this time.  At goal.   5.  History of COPD-stable  6.  DM 2-Insulin-requiring  7.  Sepsis due to Cellulitis-on Zosyn.  ID following.     Thank you for the consult.  We will follow this patient along the hospitalization.    Constance Dawkins MD

## 2024-08-22 NOTE — PROGRESS NOTES
discharge recommendations  Learning Assessment:  patient verbalizes understanding, would benefit from continued reinforcement    Assessment  Activity Tolerance: Fair, limited by pain in LLE and LBP, on RA throughout (SPO2 90-95% throughout)  Impairments Requiring Therapeutic Intervention: decreased functional mobility, decreased ADL status, decreased strength, decreased endurance, decreased balance, decreased IADL  Prognosis: good  Clinical Assessment: Pt progressing fairly at this time as he no longer requires use of O2 throughout therapeutic intervention. Pt CGA for transfers and functional mobility w/ use of RW. Pt requires max A for LB dressing and would benefit from education on use of reacher/dressing stick to faciliate independence in ADLs. Continue per POC.   Safety Interventions: patient left in chair, chair alarm in place, call light within reach, and nurse notified    Plan  Frequency: 3-5 x/per week  Current Treatment Recommendations: strengthening, balance training, functional mobility training, transfer training, endurance training, ADL/self-care training, IADL training, and safety education    Goals  Patient Goals: pt did not state    Short Term Goals:  Time Frame: by d/c  Patient will complete upper body ADL at set up assistance -MET 8/21  Patient will complete lower body ADL at minimal assistance   Patient will complete toileting at minimal assistance   Patient will complete functional transfers at stand by assistance   Patient will complete functional mobility at stand by assistance     Above goals reviewed on 8/22/2024.  All goals are ongoing at this time unless indicated above.       Therapy Session Time     Individual Group Co-treatment   Time In 0907     Time Out 0935     Minutes 28          Timed Code Treatment Minutes:  Timed Code Treatment Minutes: 28 Minutes    Total Treatment Minutes:  28       Electronically Signed By: LIANA Maxwell OTR/L, CNS ([id number]) 8/22/2024  9:53 AM

## 2024-08-22 NOTE — PROGRESS NOTES
.Shift assessment completed. Routine vitals stable. Scheduled medications given. Patient is awake, alert and oriented. Respirations are easy and unlabored. Patient does not appear to be in distress. Assisted patient from bed into the chair and patient tolerated well. Patient no longer on oxygen and keeping saturation above 94% Call light within reach.

## 2024-08-22 NOTE — PLAN OF CARE
Patient free from injury this shift. Pain controlled with PRN Tramadol.   Problem: Pain  Goal: Verbalizes/displays adequate comfort level or baseline comfort level  Outcome: Progressing     Problem: Safety - Adult  Goal: Free from fall injury  Outcome: Progressing     Problem: Chronic Conditions and Co-morbidities  Goal: Patient's chronic conditions and co-morbidity symptoms are monitored and maintained or improved  Outcome: Progressing  Flowsheets (Taken 8/21/2024 2017)  Care Plan - Patient's Chronic Conditions and Co-Morbidity Symptoms are Monitored and Maintained or Improved: Monitor and assess patient's chronic conditions and comorbid symptoms for stability, deterioration, or improvement

## 2024-08-23 PROBLEM — Z79.2 RECEIVING INTRAVENOUS ANTIBIOTIC TREATMENT AS OUTPATIENT: Status: ACTIVE | Noted: 2024-08-23

## 2024-08-23 LAB
ALBUMIN SERPL-MCNC: 2.6 G/DL (ref 3.4–5)
ALBUMIN/GLOB SERPL: 0.7 {RATIO} (ref 1.1–2.2)
ALP SERPL-CCNC: 62 U/L (ref 40–129)
ALT SERPL-CCNC: 12 U/L (ref 10–40)
ANION GAP SERPL CALCULATED.3IONS-SCNC: 11 MMOL/L (ref 3–16)
AST SERPL-CCNC: 14 U/L (ref 15–37)
BASOPHILS # BLD: 0 K/UL (ref 0–0.2)
BASOPHILS NFR BLD: 0.1 %
BILIRUB SERPL-MCNC: <0.2 MG/DL (ref 0–1)
BUN SERPL-MCNC: 28 MG/DL (ref 7–20)
CALCIUM SERPL-MCNC: 8.1 MG/DL (ref 8.3–10.6)
CHLORIDE SERPL-SCNC: 102 MMOL/L (ref 99–110)
CO2 SERPL-SCNC: 22 MMOL/L (ref 21–32)
CREAT SERPL-MCNC: 1.4 MG/DL (ref 0.8–1.3)
DEPRECATED RDW RBC AUTO: 17.4 % (ref 12.4–15.4)
EOSINOPHIL # BLD: 0.1 K/UL (ref 0–0.6)
EOSINOPHIL NFR BLD: 1.5 %
GFR SERPLBLD CREATININE-BSD FMLA CKD-EPI: 53 ML/MIN/{1.73_M2}
GLUCOSE BLD-MCNC: 125 MG/DL (ref 70–99)
GLUCOSE BLD-MCNC: 153 MG/DL (ref 70–99)
GLUCOSE BLD-MCNC: 176 MG/DL (ref 70–99)
GLUCOSE BLD-MCNC: 199 MG/DL (ref 70–99)
GLUCOSE SERPL-MCNC: 165 MG/DL (ref 70–99)
HCT VFR BLD AUTO: 29.3 % (ref 40.5–52.5)
HGB BLD-MCNC: 9.4 G/DL (ref 13.5–17.5)
LYMPHOCYTES # BLD: 2.5 K/UL (ref 1–5.1)
LYMPHOCYTES NFR BLD: 29.3 %
MAGNESIUM SERPL-MCNC: 2.2 MG/DL (ref 1.8–2.4)
MCH RBC QN AUTO: 24.3 PG (ref 26–34)
MCHC RBC AUTO-ENTMCNC: 32 G/DL (ref 31–36)
MCV RBC AUTO: 75.9 FL (ref 80–100)
MONOCYTES # BLD: 0.7 K/UL (ref 0–1.3)
MONOCYTES NFR BLD: 8.2 %
NEUTROPHILS # BLD: 5.3 K/UL (ref 1.7–7.7)
NEUTROPHILS NFR BLD: 60.9 %
PERFORMED ON: ABNORMAL
PLATELET # BLD AUTO: 207 K/UL (ref 135–450)
PMV BLD AUTO: 7.7 FL (ref 5–10.5)
POTASSIUM SERPL-SCNC: 4.1 MMOL/L (ref 3.5–5.1)
PROT SERPL-MCNC: 6.2 G/DL (ref 6.4–8.2)
RBC # BLD AUTO: 3.86 M/UL (ref 4.2–5.9)
SODIUM SERPL-SCNC: 135 MMOL/L (ref 136–145)
WBC # BLD AUTO: 8.6 K/UL (ref 4–11)

## 2024-08-23 PROCEDURE — 6370000000 HC RX 637 (ALT 250 FOR IP): Performed by: INTERNAL MEDICINE

## 2024-08-23 PROCEDURE — 2580000003 HC RX 258: Performed by: NURSE PRACTITIONER

## 2024-08-23 PROCEDURE — 97110 THERAPEUTIC EXERCISES: CPT

## 2024-08-23 PROCEDURE — 36415 COLL VENOUS BLD VENIPUNCTURE: CPT

## 2024-08-23 PROCEDURE — 97116 GAIT TRAINING THERAPY: CPT

## 2024-08-23 PROCEDURE — 94761 N-INVAS EAR/PLS OXIMETRY MLT: CPT

## 2024-08-23 PROCEDURE — 6360000002 HC RX W HCPCS: Performed by: INTERNAL MEDICINE

## 2024-08-23 PROCEDURE — 80053 COMPREHEN METABOLIC PANEL: CPT

## 2024-08-23 PROCEDURE — 6370000000 HC RX 637 (ALT 250 FOR IP): Performed by: STUDENT IN AN ORGANIZED HEALTH CARE EDUCATION/TRAINING PROGRAM

## 2024-08-23 PROCEDURE — 94640 AIRWAY INHALATION TREATMENT: CPT

## 2024-08-23 PROCEDURE — 6370000000 HC RX 637 (ALT 250 FOR IP): Performed by: NURSE PRACTITIONER

## 2024-08-23 PROCEDURE — 2060000000 HC ICU INTERMEDIATE R&B

## 2024-08-23 PROCEDURE — 6360000002 HC RX W HCPCS: Performed by: STUDENT IN AN ORGANIZED HEALTH CARE EDUCATION/TRAINING PROGRAM

## 2024-08-23 PROCEDURE — 83735 ASSAY OF MAGNESIUM: CPT

## 2024-08-23 PROCEDURE — 2700000000 HC OXYGEN THERAPY PER DAY

## 2024-08-23 PROCEDURE — 85025 COMPLETE CBC W/AUTO DIFF WBC: CPT

## 2024-08-23 PROCEDURE — 2580000003 HC RX 258: Performed by: INTERNAL MEDICINE

## 2024-08-23 PROCEDURE — 97530 THERAPEUTIC ACTIVITIES: CPT

## 2024-08-23 PROCEDURE — 99233 SBSQ HOSP IP/OBS HIGH 50: CPT | Performed by: INTERNAL MEDICINE

## 2024-08-23 RX ADMIN — GABAPENTIN 300 MG: 300 CAPSULE ORAL at 21:53

## 2024-08-23 RX ADMIN — Medication 1 CAPSULE: at 08:39

## 2024-08-23 RX ADMIN — INSULIN LISPRO 8 UNITS: 100 INJECTION, SOLUTION INTRAVENOUS; SUBCUTANEOUS at 17:41

## 2024-08-23 RX ADMIN — ALBUTEROL SULFATE 2.5 MG: 2.5 SOLUTION RESPIRATORY (INHALATION) at 16:30

## 2024-08-23 RX ADMIN — BUDESONIDE AND FORMOTEROL FUMARATE DIHYDRATE 2 PUFF: 160; 4.5 AEROSOL RESPIRATORY (INHALATION) at 21:37

## 2024-08-23 RX ADMIN — APIXABAN 5 MG: 5 TABLET, FILM COATED ORAL at 21:53

## 2024-08-23 RX ADMIN — APIXABAN 5 MG: 5 TABLET, FILM COATED ORAL at 08:39

## 2024-08-23 RX ADMIN — GABAPENTIN 300 MG: 300 CAPSULE ORAL at 14:17

## 2024-08-23 RX ADMIN — MUPIROCIN: 20 OINTMENT TOPICAL at 08:43

## 2024-08-23 RX ADMIN — Medication 10 ML: at 08:39

## 2024-08-23 RX ADMIN — ATORVASTATIN CALCIUM 20 MG: 20 TABLET, FILM COATED ORAL at 08:39

## 2024-08-23 RX ADMIN — GABAPENTIN 300 MG: 300 CAPSULE ORAL at 08:39

## 2024-08-23 RX ADMIN — TRAMADOL HYDROCHLORIDE 50 MG: 50 TABLET ORAL at 21:53

## 2024-08-23 RX ADMIN — ALBUTEROL SULFATE 2.5 MG: 2.5 SOLUTION RESPIRATORY (INHALATION) at 21:37

## 2024-08-23 RX ADMIN — INSULIN GLARGINE 60 UNITS: 100 INJECTION, SOLUTION SUBCUTANEOUS at 21:54

## 2024-08-23 RX ADMIN — Medication 1 CAPSULE: at 17:41

## 2024-08-23 RX ADMIN — PIPERACILLIN AND TAZOBACTAM 3375 MG: 3; .375 INJECTION, POWDER, LYOPHILIZED, FOR SOLUTION INTRAVENOUS at 06:54

## 2024-08-23 RX ADMIN — BUDESONIDE AND FORMOTEROL FUMARATE DIHYDRATE 2 PUFF: 160; 4.5 AEROSOL RESPIRATORY (INHALATION) at 08:03

## 2024-08-23 RX ADMIN — CLOPIDOGREL BISULFATE 75 MG: 75 TABLET ORAL at 08:39

## 2024-08-23 RX ADMIN — ALBUTEROL SULFATE 2.5 MG: 2.5 SOLUTION RESPIRATORY (INHALATION) at 13:20

## 2024-08-23 RX ADMIN — TAMSULOSIN HYDROCHLORIDE 0.4 MG: 0.4 CAPSULE ORAL at 08:39

## 2024-08-23 RX ADMIN — TRAMADOL HYDROCHLORIDE 50 MG: 50 TABLET ORAL at 14:17

## 2024-08-23 RX ADMIN — ERTAPENEM SODIUM 1000 MG: 1 INJECTION INTRAMUSCULAR; INTRAVENOUS at 14:18

## 2024-08-23 RX ADMIN — ALBUTEROL SULFATE 2.5 MG: 2.5 SOLUTION RESPIRATORY (INHALATION) at 08:02

## 2024-08-23 RX ADMIN — Medication 10 ML: at 21:54

## 2024-08-23 RX ADMIN — INSULIN LISPRO 8 UNITS: 100 INJECTION, SOLUTION INTRAVENOUS; SUBCUTANEOUS at 12:40

## 2024-08-23 RX ADMIN — PANTOPRAZOLE SODIUM 40 MG: 40 INJECTION, POWDER, FOR SOLUTION INTRAVENOUS at 08:39

## 2024-08-23 ASSESSMENT — PAIN - FUNCTIONAL ASSESSMENT
PAIN_FUNCTIONAL_ASSESSMENT: PREVENTS OR INTERFERES SOME ACTIVE ACTIVITIES AND ADLS
PAIN_FUNCTIONAL_ASSESSMENT: PREVENTS OR INTERFERES SOME ACTIVE ACTIVITIES AND ADLS

## 2024-08-23 ASSESSMENT — PAIN SCALES - GENERAL
PAINLEVEL_OUTOF10: 7
PAINLEVEL_OUTOF10: 0
PAINLEVEL_OUTOF10: 8
PAINLEVEL_OUTOF10: 7
PAINLEVEL_OUTOF10: 7

## 2024-08-23 ASSESSMENT — PAIN DESCRIPTION - LOCATION
LOCATION: GENERALIZED
LOCATION: GENERALIZED

## 2024-08-23 ASSESSMENT — PAIN DESCRIPTION - DESCRIPTORS: DESCRIPTORS: JABBING;SHARP

## 2024-08-23 ASSESSMENT — PAIN DESCRIPTION - PAIN TYPE: TYPE: NEUROPATHIC PAIN;ACUTE PAIN

## 2024-08-23 ASSESSMENT — PAIN SCALES - WONG BAKER: WONGBAKER_NUMERICALRESPONSE: NO HURT

## 2024-08-23 NOTE — PLAN OF CARE
Patient free from falls and injury.   Problem: Pain  Goal: Verbalizes/displays adequate comfort level or baseline comfort level  Outcome: Progressing     Problem: Safety - Adult  Goal: Free from fall injury  Outcome: Progressing     Problem: Chronic Conditions and Co-morbidities  Goal: Patient's chronic conditions and co-morbidity symptoms are monitored and maintained or improved  Outcome: Progressing

## 2024-08-23 NOTE — PROGRESS NOTES
Infectious Diseases   Progress Note      Admission Date: 8/18/2024  Hospital Day: Hospital Day: 6   Attending: Santana Tabor MD  Date of service: 8/23/2024     Chief complaint/ Reason for consult:     Sepsis with fever of 100.8, leukocytosis, tachycardia and tachypnea  Left lower extremity cellulitis  Complicated left diabetic foot infection  Poorly controlled type 2 diabetes mellitus  Failure of outpatient antibiotics  Acute kidney injury    Microbiology:        I have reviewed allavailable micro lab data and cultures    Results       Procedure Component Value Units Date/Time    Culture, Wound [[id number]]  (Abnormal)  (Susceptibility) Collected: 08/19/24 1700    Order Status: Completed Specimen: Drainage from Foot Updated: 08/22/24 0556     Gram Stain Result  2+ Epithelial Cells   1+ Gram positive cocci       Organism Corynebacterium striatum     WOUND/ABSCESS --     Moderate growth  No further workup       Organism Alcaligenes faecalis ssp faecalis     WOUND/ABSCESS Light growth     Organism Beta Strep Group G     WOUND/ABSCESS --     Light growth  Susceptibility testing of penicillin and other beta lactams is  not necessary for beta hemolytic Streptococci since resistant  strains have not been identified. (CLSI M100)      Narrative:      ORDER#: [id number]                          ORDERED BY: PATRICIO HERZOG  SOURCE: Foot Left foot                     COLLECTED:  08/19/24 17:00  ANTIBIOTICS AT [facility]URAG.:                      RECEIVED :  08/19/24 19:20    Susceptibility        Alcaligenes faecalis ssp faecalis      BACTERIAL SUSCEPTIBILITY PANEL BY CRISS      cefepime 16 mcg/mL Intermediate      cefTRIAXone <=1 mcg/mL Sensitive      ciprofloxacin 2 mcg/mL Intermediate      gentamicin <=2 mcg/mL Resistant      levofloxacin <=0.5 mcg/mL Sensitive      meropenem <=1 mcg/mL Sensitive      piperacillin-tazobactam <=8 mcg/mL Sensitive      tobramycin 4 mcg/mL Sensitive      trimethoprim-sulfamethoxazole <=0.5/9.5 mcg/mL  ORDERED BY: MIREYA LEMA  SOURCE: Blood Antecubital-Rig              COLLECTED:  08/18/24 20:38  ANTIBIOTICS AT ANURAG.:                      RECEIVED :  08/19/24 00:57  If child <=2 yrs old please draw pediatric bottle.~Blood Culture #2             Antibiotics and immunizations:       Current antibiotics: All antibiotics and their doses were reviewed by me    Recent Abx Admin                     piperacillin-tazobactam (ZOSYN) 3,375 mg in sodium chloride 0.9 % 50 mL IVPB (mini-bag) (mg) 3,375 mg New Bag 08/23/24 0654     3,375 mg New Bag 08/22/24 2310     3,375 mg New Bag  1441                      Immunization History: All immunization history was reviewed by me today.      There is no immunization history on file for this patient.    Known drug allergies:     All allergies were reviewed and updated    Allergies   Allergen Reactions    Dilaudid [Hydromorphone Hcl]      Violent     Hydrochlorothiazide Itching       Social history:     Social History:  All social andepidemiologic history was reviewed and updated by me today as needed.     Tobacco use:   reports that he has been smoking cigarettes. He has never used smokeless tobacco.  Alcohol use:   reports no history of alcohol use.  Currently lives in: [place] [address]   reports no history of drug use.     COVID VACCINATION AND LAB RESULT RECORDS:     Internal Administration   First Dose      Second Dose           Last COVID Lab SARS-CoV-2 RNA, RT PCR (no units)   Date Value   08/19/2024 NOT DETECTED            Assessment:     The patient is a 73 y.o. old male who  has a past medical history of COPD (chronic obstructive pulmonary disease) ([facility]), Diabetes mellitus (HCC), Hyperlipidemia, and Hypertension. with following problems:    Sepsis with fever of 100.8, leukocytosis, tachycardia and tachypnea-has shown improvement  Left lower extremity cellulitis-Zosyn  Complicated left diabetic foot infection-wound cultures growing corynebacterium, group G streptococcus  acetaminophen **OR** acetaminophen, sodium phosphate 15 mmol in sodium chloride 0.9 % 250 mL IVPB, dextrose bolus **OR** dextrose bolus, glucagon (rDNA), dextrose, traMADol      Problem list:       Patient Active Problem List   Diagnosis Code    Sepsis due to cellulitis (HCC) L03.90, A41.9    COPD exacerbation (HCC) J44.1    Acute on chronic respiratory failure with hypoxia (HCC) J96.21    Acute kidney injury superimposed on chronic kidney disease (HCC) N17.9, N18.9    Elevated lactic acid level R79.89    Elevated troponin R79.89    Hypomagnesemia E83.42    Hypophosphatemia E83.39    Type 2 diabetes mellitus (HCC) E11.9    Obesity with sleep apnea G47.30, E66.9    CAD (coronary artery disease) I25.10    Septicemia (HCC) A41.9    Elevated sed rate R70.0    CRP elevated R79.82    Cellulitis of left leg L03.116    Lactic acidosis E87.20    Diabetes education, encounter for Z71.89    Gram-negative infection A49.9    Bacterial infection due to Streptococcus, group G B95.4       Please note that this chart was generated using Dragon dictation software. Although every effort was made to ensure the accuracy of this automated transcription, some errors in transcription may have occurred inadvertently. If you may need any clarification, please do not hesitate to contact me through EPIC or at the phone number provided below with my electronic signature.  Any pictures or media included in this note were obtained after taking informed verbal consent from the patient and with their approval to include those in the patient's medical record.        Eleno Fried MD, MPH, FACP, FIDSA  8/23/2024, 11:17 AM  Central Office Phone: [phone]  Central Office Fax: [phone]    [facility]   [address], [address] ([address])  [place] [address]  [facility] [facility] days:  Tuesday & Thursday AM    [facility]  [address] (Medical office Building)  [place]

## 2024-08-23 NOTE — PROGRESS NOTES
Physician Progress Note      PATIENT:               SEVEN PARSON  [facility] #:                  [id number]  :                       1950  ADMIT DATE:       2024 8:18 PM  DISCH DATE:  RESPONDING  PROVIDER #:        RENÉE MASSEY MD          QUERY TEXT:    Patient admitted with sepsis.Noted documentation of Acute Kidney Injury in   Internal medicine PN on  on .In order to support the diagnosis of BRYCE,   please include additional clinical indicators in your documentation.? Or   please document if the diagnosis of BRYCE has been ruled out after further   study.    The medical record reflects the following:  Risk Factors: CKD, Sepsis  Clinical Indicators: Internal medicine PN on -Acute kidney injury   superimposed on chronic kidney disease. In the setting of sepsis. Baseline   creatinine around 1.6. Peak creatinine at 2.2. Improved with IV hydration.  BUN/Creat/GFR-25/2.2/31 on   BUN/Creat/GFR-20/1.8/39 on   BUN/Creat/GFR-27/1.4/53 on   Treatment: IVF, BMP  Defined by Kidney Disease Improving Global Outcomes (KDIGO) clinical practice   guideline for acute kidney injury:  -Increase in SCr by greater than or equal to 0.3 mg/dl within 48 hours; or  -Increase or decrease in SCr to greater than or equal to 1.5 times baseline,   which is known or presumed to have occurred within the prior 7 days; or  -Urine volume < 0.5ml/kg/h for 6 hours.    Thank you,  ALOK Bales, CDS  Options provided:  -- Acute kidney injury evidenced by, Please document evidence as well as a   numerical baseline creatinine, if known.  -- Acute kidney injury ruled out after study  -- Other - I will add my own diagnosis  -- Disagree - Not applicable / Not valid  -- Disagree - Clinically unable to determine / Unknown  -- Refer to Clinical Documentation Reviewer    PROVIDER RESPONSE TEXT:    Acute kidney injury was ruled out after study.    Query created by: Fiona Renee on 2024 4:19 AM      Electronically signed by:

## 2024-08-23 NOTE — PROGRESS NOTES
[facility] - Inpatient Rehabilitation Department   Phone: [phone]    Physical Therapy    [] Initial Evaluation            [x] Daily Treatment Note         [] Discharge Summary      Patient: Gamal Blue   : 1950   MRN: [id number]   Date of Service:  2024  Admitting Diagnosis: Sepsis due to cellulitis (HCC)  Current Admission Summary: Gamal Blue is a(n) 73 y.o. male with past medical history as below   who arrives via EMS for recurrent falls.  Patient states that he was unable to get out of bed today due to generalized fatigue.  States that he has been on an oral antibiotic for cellulitis of the left lower extremity, but symptoms not improved affirms shortness of breath.  States that he does not wear oxygen at home.  He does affirm a history of a DVT that he used to take blood thinners for but states he does not take them anymore   Past Medical History:  has a past medical history of COPD (chronic obstructive pulmonary disease) (HCC), Diabetes mellitus (HCC), Hyperlipidemia, and Hypertension.  Past Surgical History:  has a past surgical history that includes Appendectomy.  Discharge Recommendations: Gamal Blue scored a 17/24 on the AM-PAC short mobility form. Current research shows that an AM-PAC score of 17 or less is typically not associated with a discharge to the patient's home setting. Based on the patient's AM-PAC score and their current functional mobility deficits, it is recommended that the patient have 3-5 sessions per week of Physical Therapy at d/c to increase the patient's independence.  Please see assessment section for further patient specific details.    If patient discharges prior to next session this note will serve as a discharge summary.  Please see below for the latest assessment towards goals.     DME Required For Discharge: patient has all required DME for discharge  Precautions/Restrictions: high fall risk  Weight Bearing Restrictions: no

## 2024-08-23 NOTE — PLAN OF CARE
Problem: Discharge Planning  Goal: Discharge to home or other facility with appropriate resources  Outcome: Progressing  Flowsheets (Taken 8/23/2024 0830)  Discharge to home or other facility with appropriate resources: Identify barriers to discharge with patient and caregiver     Problem: Pain  Goal: Verbalizes/displays adequate comfort level or baseline comfort level  8/23/2024 0855 by Batsheva Lobo RN  Outcome: Progressing  8/23/2024 0258 by Arnaldo Charles RN  Outcome: Progressing     Problem: Safety - Adult  Goal: Free from fall injury  8/23/2024 0855 by Batsheva Lobo RN  Outcome: Progressing  8/23/2024 0258 by Arnaldo Charles RN  Outcome: Progressing     Problem: Skin/Tissue Integrity  Goal: Absence of new skin breakdown  Description: 1.  Monitor for areas of redness and/or skin breakdown  2.  Assess vascular access sites hourly  3.  Every 4-6 hours minimum:  Change oxygen saturation probe site  4.  Every 4-6 hours:  If on nasal continuous positive airway pressure, respiratory therapy assess nares and determine need for appliance change or resting period.  Outcome: Progressing     Problem: ABCDS Injury Assessment  Goal: Absence of physical injury  Outcome: Progressing     Problem: Chronic Conditions and Co-morbidities  Goal: Patient's chronic conditions and co-morbidity symptoms are monitored and maintained or improved  8/23/2024 0855 by Batsheva Lobo RN  Outcome: Progressing  Flowsheets (Taken 8/23/2024 0830)  Care Plan - Patient's Chronic Conditions and Co-Morbidity Symptoms are Monitored and Maintained or Improved: Monitor and assess patient's chronic conditions and comorbid symptoms for stability, deterioration, or improvement  8/23/2024 0258 by Arnaldo Charles RN  Outcome: Progressing     Problem: Respiratory - Adult  Goal: Achieves optimal ventilation and oxygenation  Outcome: Progressing  Flowsheets (Taken 8/23/2024 0830)  Achieves optimal ventilation and oxygenation: Assess for changes in

## 2024-08-23 NOTE — CARE COORDINATION
Discharge Planning Note    noted patient will need IV antibiotic's at discharge.  left message for Francesca at the [facility] regarding final IV plan.   did call Jordyn with [facility] to have her run benefit but patient will likely have out of pocket cost.    also noted patient does not have part B so IV antibiotic's likely will need to go through [facility] approval. [facility].   to follow [facility] IV antibiotic approval and agency that will provide antibiotics.   Electronically signed by DETSINY Manley on 8/23/2024 at 12:23 PM  Phone [phone]

## 2024-08-23 NOTE — PROGRESS NOTES
[facility] Note    Patient Active Problem List   Diagnosis    Sepsis due to cellulitis (HCC)    COPD exacerbation (HCC)    Acute on chronic respiratory failure with hypoxia (HCC)    Acute kidney injury superimposed on chronic kidney disease (HCC)    Elevated lactic acid level    Elevated troponin    Hypomagnesemia    Hypophosphatemia    Type 2 diabetes mellitus (HCC)    Obesity with sleep apnea    CAD (coronary artery disease)    Septicemia (HCC)    Elevated sed rate    CRP elevated    Cellulitis of left leg    Lactic acidosis    Diabetes education, encounter for    Gram-negative infection    Bacterial infection due to Streptococcus, group G    Receiving intravenous antibiotic treatment as outpatient       Past Medical History:   has a past medical history of COPD (chronic obstructive pulmonary disease) (HCC), Diabetes mellitus (HCC), Hyperlipidemia, and Hypertension.    Past Social History:   reports that he has been smoking cigarettes. He has never used smokeless tobacco. He reports that he does not drink alcohol and does not use drugs.    Subjective:    Has bilateral leg swelling.  Left foot pain.    Review of Systems   Constitutional:  Negative for activity change, appetite change, chills, fatigue, fever and unexpected weight change.   HENT:  Negative for congestion and facial swelling.    Eyes:  Negative for photophobia, discharge and redness.   Respiratory:  Negative for cough, chest tightness and shortness of breath.    Cardiovascular:  Positive for leg swelling. Negative for chest pain and palpitations.   Gastrointestinal:  Negative for abdominal distention, abdominal pain, blood in stool, constipation, diarrhea, nausea and vomiting.   Endocrine: Negative for cold intolerance, heat intolerance and polyuria.   Genitourinary:  Negative for decreased urine volume, difficulty urinating, flank pain and hematuria.   Musculoskeletal:  Negative for joint

## 2024-08-23 NOTE — PROGRESS NOTES
Shift assessment completed. Routine vitals stable. Scheduled medications given. Patient is awake, alert and oriented. Respirations are easy and unlabored. Patient does not appear to be in distress, resting comfortably at this time. Assisted patient from bed into the chair and patient tolerated well.  Call light within reach.

## 2024-08-24 LAB
ALBUMIN SERPL-MCNC: 2.7 G/DL (ref 3.4–5)
ALBUMIN/GLOB SERPL: 0.8 {RATIO} (ref 1.1–2.2)
ALP SERPL-CCNC: 60 U/L (ref 40–129)
ALT SERPL-CCNC: 11 U/L (ref 10–40)
ANION GAP SERPL CALCULATED.3IONS-SCNC: 9 MMOL/L (ref 3–16)
AST SERPL-CCNC: 9 U/L (ref 15–37)
BASOPHILS # BLD: 0 K/UL (ref 0–0.2)
BASOPHILS NFR BLD: 0.1 %
BILIRUB SERPL-MCNC: <0.2 MG/DL (ref 0–1)
BUN SERPL-MCNC: 20 MG/DL (ref 7–20)
CA-I BLD-SCNC: 1.09 MMOL/L (ref 1.12–1.32)
CALCIUM SERPL-MCNC: 7.9 MG/DL (ref 8.3–10.6)
CHLORIDE SERPL-SCNC: 103 MMOL/L (ref 99–110)
CO2 SERPL-SCNC: 23 MMOL/L (ref 21–32)
CREAT SERPL-MCNC: 1.2 MG/DL (ref 0.8–1.3)
DEPRECATED RDW RBC AUTO: 17.3 % (ref 12.4–15.4)
EOSINOPHIL # BLD: 0.2 K/UL (ref 0–0.6)
EOSINOPHIL NFR BLD: 2.2 %
GFR SERPLBLD CREATININE-BSD FMLA CKD-EPI: 64 ML/MIN/{1.73_M2}
GLUCOSE BLD-MCNC: 118 MG/DL (ref 70–99)
GLUCOSE BLD-MCNC: 167 MG/DL (ref 70–99)
GLUCOSE BLD-MCNC: 179 MG/DL (ref 70–99)
GLUCOSE BLD-MCNC: 257 MG/DL (ref 70–99)
GLUCOSE SERPL-MCNC: 171 MG/DL (ref 70–99)
HCT VFR BLD AUTO: 29.2 % (ref 40.5–52.5)
HGB BLD-MCNC: 9.5 G/DL (ref 13.5–17.5)
LYMPHOCYTES # BLD: 2.6 K/UL (ref 1–5.1)
LYMPHOCYTES NFR BLD: 26.6 %
MAGNESIUM SERPL-MCNC: 2.1 MG/DL (ref 1.8–2.4)
MCH RBC QN AUTO: 24.6 PG (ref 26–34)
MCHC RBC AUTO-ENTMCNC: 32.5 G/DL (ref 31–36)
MCV RBC AUTO: 75.6 FL (ref 80–100)
MONOCYTES # BLD: 0.7 K/UL (ref 0–1.3)
MONOCYTES NFR BLD: 6.6 %
NEUTROPHILS # BLD: 6.4 K/UL (ref 1.7–7.7)
NEUTROPHILS NFR BLD: 64.5 %
PERFORMED ON: ABNORMAL
PH BLDV: 7.43 [PH] (ref 7.35–7.45)
PLATELET # BLD AUTO: 225 K/UL (ref 135–450)
PMV BLD AUTO: 7 FL (ref 5–10.5)
POTASSIUM SERPL-SCNC: 3.9 MMOL/L (ref 3.5–5.1)
PROT SERPL-MCNC: 6 G/DL (ref 6.4–8.2)
RBC # BLD AUTO: 3.87 M/UL (ref 4.2–5.9)
SODIUM SERPL-SCNC: 135 MMOL/L (ref 136–145)
WBC # BLD AUTO: 9.9 K/UL (ref 4–11)

## 2024-08-24 PROCEDURE — 6370000000 HC RX 637 (ALT 250 FOR IP): Performed by: INTERNAL MEDICINE

## 2024-08-24 PROCEDURE — 94640 AIRWAY INHALATION TREATMENT: CPT

## 2024-08-24 PROCEDURE — 6360000002 HC RX W HCPCS: Performed by: STUDENT IN AN ORGANIZED HEALTH CARE EDUCATION/TRAINING PROGRAM

## 2024-08-24 PROCEDURE — 94660 CPAP INITIATION&MGMT: CPT

## 2024-08-24 PROCEDURE — 80053 COMPREHEN METABOLIC PANEL: CPT

## 2024-08-24 PROCEDURE — 82330 ASSAY OF CALCIUM: CPT

## 2024-08-24 PROCEDURE — 2060000000 HC ICU INTERMEDIATE R&B

## 2024-08-24 PROCEDURE — 94761 N-INVAS EAR/PLS OXIMETRY MLT: CPT

## 2024-08-24 PROCEDURE — 6370000000 HC RX 637 (ALT 250 FOR IP): Performed by: NURSE PRACTITIONER

## 2024-08-24 PROCEDURE — 6370000000 HC RX 637 (ALT 250 FOR IP): Performed by: STUDENT IN AN ORGANIZED HEALTH CARE EDUCATION/TRAINING PROGRAM

## 2024-08-24 PROCEDURE — 85025 COMPLETE CBC W/AUTO DIFF WBC: CPT

## 2024-08-24 PROCEDURE — 6360000002 HC RX W HCPCS: Performed by: INTERNAL MEDICINE

## 2024-08-24 PROCEDURE — 2580000003 HC RX 258: Performed by: INTERNAL MEDICINE

## 2024-08-24 PROCEDURE — 83735 ASSAY OF MAGNESIUM: CPT

## 2024-08-24 PROCEDURE — 2580000003 HC RX 258: Performed by: NURSE PRACTITIONER

## 2024-08-24 RX ORDER — GABAPENTIN 300 MG/1
300 CAPSULE ORAL 3 TIMES DAILY
Qty: 90 CAPSULE | Refills: 0 | Status: SHIPPED | OUTPATIENT
Start: 2024-08-24 | End: 2024-09-23

## 2024-08-24 RX ORDER — LACTOBACILLUS RHAMNOSUS GG 10B CELL
1 CAPSULE ORAL 2 TIMES DAILY WITH MEALS
Qty: 26 CAPSULE | Refills: 0 | Status: SHIPPED | OUTPATIENT
Start: 2024-08-24 | End: 2024-09-06

## 2024-08-24 RX ORDER — MUPIROCIN 20 MG/G
OINTMENT TOPICAL
Qty: 1 G | Refills: 1 | Status: SHIPPED | OUTPATIENT
Start: 2024-08-25

## 2024-08-24 RX ADMIN — APIXABAN 5 MG: 5 TABLET, FILM COATED ORAL at 20:29

## 2024-08-24 RX ADMIN — PANTOPRAZOLE SODIUM 40 MG: 40 INJECTION, POWDER, FOR SOLUTION INTRAVENOUS at 08:46

## 2024-08-24 RX ADMIN — MUPIROCIN: 20 OINTMENT TOPICAL at 08:51

## 2024-08-24 RX ADMIN — ATORVASTATIN CALCIUM 20 MG: 20 TABLET, FILM COATED ORAL at 08:46

## 2024-08-24 RX ADMIN — Medication 1 CAPSULE: at 08:46

## 2024-08-24 RX ADMIN — ALBUTEROL SULFATE 2.5 MG: 2.5 SOLUTION RESPIRATORY (INHALATION) at 16:07

## 2024-08-24 RX ADMIN — TAMSULOSIN HYDROCHLORIDE 0.4 MG: 0.4 CAPSULE ORAL at 08:46

## 2024-08-24 RX ADMIN — Medication 1 CAPSULE: at 17:24

## 2024-08-24 RX ADMIN — APIXABAN 5 MG: 5 TABLET, FILM COATED ORAL at 08:46

## 2024-08-24 RX ADMIN — INSULIN GLARGINE 60 UNITS: 100 INJECTION, SOLUTION SUBCUTANEOUS at 20:28

## 2024-08-24 RX ADMIN — ALBUTEROL SULFATE 2.5 MG: 2.5 SOLUTION RESPIRATORY (INHALATION) at 11:27

## 2024-08-24 RX ADMIN — BUDESONIDE AND FORMOTEROL FUMARATE DIHYDRATE 2 PUFF: 160; 4.5 AEROSOL RESPIRATORY (INHALATION) at 21:38

## 2024-08-24 RX ADMIN — Medication 20 ML: at 20:29

## 2024-08-24 RX ADMIN — GABAPENTIN 300 MG: 300 CAPSULE ORAL at 20:28

## 2024-08-24 RX ADMIN — Medication 10 ML: at 08:48

## 2024-08-24 RX ADMIN — GABAPENTIN 300 MG: 300 CAPSULE ORAL at 14:33

## 2024-08-24 RX ADMIN — BUDESONIDE AND FORMOTEROL FUMARATE DIHYDRATE 2 PUFF: 160; 4.5 AEROSOL RESPIRATORY (INHALATION) at 11:33

## 2024-08-24 RX ADMIN — CLOPIDOGREL BISULFATE 75 MG: 75 TABLET ORAL at 08:46

## 2024-08-24 RX ADMIN — ALBUTEROL SULFATE 2.5 MG: 2.5 SOLUTION RESPIRATORY (INHALATION) at 21:38

## 2024-08-24 RX ADMIN — GABAPENTIN 300 MG: 300 CAPSULE ORAL at 08:46

## 2024-08-24 RX ADMIN — TRAMADOL HYDROCHLORIDE 50 MG: 50 TABLET ORAL at 11:01

## 2024-08-24 RX ADMIN — ERTAPENEM SODIUM 1000 MG: 1 INJECTION INTRAMUSCULAR; INTRAVENOUS at 14:34

## 2024-08-24 RX ADMIN — Medication 10 ML: at 20:30

## 2024-08-24 ASSESSMENT — PAIN SCALES - WONG BAKER
WONGBAKER_NUMERICALRESPONSE: NO HURT
WONGBAKER_NUMERICALRESPONSE: HURTS LITTLE MORE
WONGBAKER_NUMERICALRESPONSE: NO HURT

## 2024-08-24 ASSESSMENT — PAIN SCALES - GENERAL
PAINLEVEL_OUTOF10: 4
PAINLEVEL_OUTOF10: 4
PAINLEVEL_OUTOF10: 5
PAINLEVEL_OUTOF10: 0
PAINLEVEL_OUTOF10: 7
PAINLEVEL_OUTOF10: 4

## 2024-08-24 ASSESSMENT — PAIN DESCRIPTION - LOCATION
LOCATION: GENERALIZED
LOCATION: TOE (COMMENT WHICH ONE)

## 2024-08-24 ASSESSMENT — PAIN DESCRIPTION - ORIENTATION: ORIENTATION: LEFT

## 2024-08-24 NOTE — PLAN OF CARE
Problem: Discharge Planning  Goal: Discharge to home or other facility with appropriate resources  8/24/2024 0914 by Batsheva Lobo RN  Outcome: Progressing  Flowsheets (Taken 8/24/2024 0909)  Discharge to home or other facility with appropriate resources: Identify barriers to discharge with patient and caregiver  8/24/2024 0256 by Cris Abarca RN  Outcome: Progressing     Problem: Pain  Goal: Verbalizes/displays adequate comfort level or baseline comfort level  8/24/2024 0914 by Batsheva Lobo RN  Outcome: Progressing  Flowsheets (Taken 8/24/2024 0830)  Verbalizes/displays adequate comfort level or baseline comfort level: Encourage patient to monitor pain and request assistance  8/24/2024 0256 by Cris Abarca RN  Outcome: Progressing     Problem: Safety - Adult  Goal: Free from fall injury  8/24/2024 0914 by Batsheva Lobo RN  Outcome: Progressing  8/24/2024 0256 by Cris Abarca RN  Outcome: Progressing     Problem: Skin/Tissue Integrity  Goal: Absence of new skin breakdown  Description: 1.  Monitor for areas of redness and/or skin breakdown  2.  Assess vascular access sites hourly  3.  Every 4-6 hours minimum:  Change oxygen saturation probe site  4.  Every 4-6 hours:  If on nasal continuous positive airway pressure, respiratory therapy assess nares and determine need for appliance change or resting period.  8/24/2024 0914 by Batsheva Lobo RN  Outcome: Progressing  8/24/2024 0256 by Cris Abarca RN  Outcome: Adequate for Discharge     Problem: ABCDS Injury Assessment  Goal: Absence of physical injury  8/24/2024 0914 by Batsheva Lobo RN  Outcome: Progressing  8/24/2024 0256 by Cris Abarca RN  Outcome: Adequate for Discharge     Problem: Chronic Conditions and Co-morbidities  Goal: Patient's chronic conditions and co-morbidity symptoms are monitored and maintained or improved  8/24/2024 0914 by Batsheva Lobo RN  Outcome: Progressing  Flowsheets (Taken 8/24/2024 0909)  Care Plan -

## 2024-08-24 NOTE — PROGRESS NOTES
Admit Date: 8/18/2024  Diet: ADULT DIET; Regular; 3 carb choices (45 gm/meal)    CC: LE cellulitis    Interval history:   Overnight, there were no acute events.  Patient's vitals remained stable.    Patient was seen this morning. I discussed the plan of the day with him in detail. All questions were addressed and answered to patient's satisfaction. Patient denies fevers, chills, nausea, vomiting, chest pain, shortness of breath, diarrhea, constipation, dysuria, urinary frequency or urgency.     Plan:     -Pt is discharged  -Per ID, continue IV Invanz through 9/6/24    Assessment:   Gamal Blue is a 73 y.o. male with PMH of COPD, type 2 diabetes, hypertension, VTE, CAD, obesity with sleep apnea on CPAP, hyperlipidemia, stage III CKD with baseline creatinine of 1.6-1.7  who was admitted with LE Cellulitis      Sepsis due to cellulitis (HCC)    Left lower extremity cellulitis  Left lower extremity with erythema over the shin as well as the foot with disclamation of the skin on the toes, fluctuance noted.  Concerning for abscess.  - ID consult  - Podiatry consult  - Pain management       COPD exacerbation (HCC)    Acute on chronic respiratory failure with hypoxia (HCC)  On presentation, patient had desaturations of 85% on room air.  At baseline he reports not to be on oxygen and uses BiPAP at night.  - Will continue breathing therapy with cardiopulmonary protocol  - Continue systemic steroids  - O2 supplementation with target sats greater than 90%       Acute kidney injury superimposed on chronic kidney disease (HCC): In the setting of sepsis.  Baseline creatinine around 1.6.  Peak creatinine at 2.2.  Improved with IV hydration.  - Stop IV fluids and monitor off fluids  - Monitor renal function and electrolytes    Type 2 diabetes mellitus (HCC): On sliding scale insulin with glargine.  Closely monitor glycemic levels with adjustment on steroids      Obesity with sleep apnea: On CPAP at night      CAD (coronary

## 2024-08-24 NOTE — PLAN OF CARE
Problem: Discharge Planning  Goal: Discharge to home or other facility with appropriate resources  8/24/2024 1239 by Batsheva Lobo RN  Outcome: Completed  8/24/2024 0914 by Batsheva Lobo RN  Outcome: Progressing  Flowsheets (Taken 8/24/2024 0909)  Discharge to home or other facility with appropriate resources: Identify barriers to discharge with patient and caregiver  8/24/2024 0256 by Cris Abarca RN  Outcome: Progressing     Problem: Pain  Goal: Verbalizes/displays adequate comfort level or baseline comfort level  8/24/2024 1239 by Batsheva Lobo RN  Outcome: Completed  8/24/2024 0914 by Batsheva Lobo RN  Outcome: Progressing  Flowsheets (Taken 8/24/2024 0830)  Verbalizes/displays adequate comfort level or baseline comfort level: Encourage patient to monitor pain and request assistance  8/24/2024 0256 by Cris Abarca RN  Outcome: Progressing     Problem: Safety - Adult  Goal: Free from fall injury  8/24/2024 1239 by Batsheva Lobo RN  Outcome: Completed  8/24/2024 0914 by Batsheva Lobo RN  Outcome: Progressing  8/24/2024 0256 by Cris Abarca RN  Outcome: Progressing     Problem: Skin/Tissue Integrity  Goal: Absence of new skin breakdown  Description: 1.  Monitor for areas of redness and/or skin breakdown  2.  Assess vascular access sites hourly  3.  Every 4-6 hours minimum:  Change oxygen saturation probe site  4.  Every 4-6 hours:  If on nasal continuous positive airway pressure, respiratory therapy assess nares and determine need for appliance change or resting period.  8/24/2024 1239 by Batsheva Lobo RN  Outcome: Completed  8/24/2024 0914 by Batsheva Lobo RN  Outcome: Progressing  8/24/2024 0256 by Cris Abarca RN  Outcome: Adequate for Discharge     Problem: ABCDS Injury Assessment  Goal: Absence of physical injury  8/24/2024 1239 by Batsheva Lobo RN  Outcome: Completed  8/24/2024 0914 by Batsheva Lobo RN  Outcome: Progressing  8/24/2024 0256 by Cris Abarca RN  Outcome:  Level of Function: Assess patient stability and activity tolerance for standing, transferring and ambulating with or without assistive devices  8/24/2024 0256 by Cris Abarca, RN  Outcome: Progressing  Goal: Return ADL status to a safe level of function  8/24/2024 1239 by Batsheva Lobo, RN  Outcome: Completed  8/24/2024 0914 by Batsheva Lobo, RN  Outcome: Progressing  Flowsheets (Taken 8/24/2024 0909)  Return ADL Status to a Safe Level of Function: Administer medication as ordered  8/24/2024 0256 by Cris Abarca, RN  Outcome: Progressing

## 2024-08-24 NOTE — DISCHARGE INSTRUCTIONS
Please call and make an appointment with your PCP within 1 week  Please call and make an appointment with Podiatry  Please take all your medications as prescribed including any new ones on discharge

## 2024-08-24 NOTE — PROGRESS NOTES
..Shift assessment completed. Routine vitals stable. Scheduled medications given. Patient is awake, alert and oriented. Respirations are easy and unlabored. Patient does not appear to be in distress, resting comfortably at this time. Call light within reach.

## 2024-08-24 NOTE — PLAN OF CARE
Problem: Discharge Planning  Goal: Discharge to home or other facility with appropriate resources  Outcome: Progressing     Problem: Pain  Goal: Verbalizes/displays adequate comfort level or baseline comfort level  Outcome: Progressing     Problem: Safety - Adult  Goal: Free from fall injury  Outcome: Progressing     Problem: Musculoskeletal - Adult  Goal: Return mobility to safest level of function  Outcome: Progressing  Goal: Return ADL status to a safe level of function  Outcome: Progressing

## 2024-08-24 NOTE — PROGRESS NOTES
Located within [facility] Note    Patient Active Problem List   Diagnosis    Sepsis due to cellulitis (HCC)    COPD exacerbation (HCC)    Acute on chronic respiratory failure with hypoxia (HCC)    Acute kidney injury superimposed on chronic kidney disease (HCC)    Elevated lactic acid level    Elevated troponin    Hypomagnesemia    Hypophosphatemia    Type 2 diabetes mellitus (HCC)    Obesity with sleep apnea    CAD (coronary artery disease)    Septicemia (HCC)    Elevated sed rate    CRP elevated    Cellulitis of left leg    Lactic acidosis    Diabetes education, encounter for    Gram-negative infection    Bacterial infection due to Streptococcus, group G    Receiving intravenous antibiotic treatment as outpatient       Past Medical History:   has a past medical history of COPD (chronic obstructive pulmonary disease) (HCC), Diabetes mellitus (HCC), Hyperlipidemia, and Hypertension.    Past Social History:   reports that he has been smoking cigarettes. He has never used smokeless tobacco. He reports that he does not drink alcohol and does not use drugs.    Subjective:    Resting in room  Has bilateral leg swelling.  Left foot pain.        Objective:      BP (!) 157/70   Pulse 85   Temp 97.9 °F (36.6 °C) (Oral)   Resp 18   Ht 1.727 m (5' 8\")   Wt 120.3 kg (265 lb 4.8 oz)   SpO2 93%   BMI 40.34 kg/m²     Wt Readings from Last 3 Encounters:   08/21/24 120.3 kg (265 lb 4.8 oz)   01/01/22 117.9 kg (260 lb)   10/23/17 112.5 kg (248 lb)       BP Readings from Last 3 Encounters:   08/24/24 (!) 157/70   01/01/22 (!) 107/54   10/23/17 (!) 178/118     Chest- clear  Heart-regular  Abd-soft  Ext-1+ edema    Labs  Hemoglobin   Date Value Ref Range Status   08/24/2024 9.5 (L) 13.5 - 17.5 g/dL Final     Hematocrit   Date Value Ref Range Status   08/24/2024 29.2 (L) 40.5 - 52.5 % Final     WBC   Date Value Ref Range Status   08/24/2024 9.9 4.0 - 11.0 K/uL Final     Platelets   Date  Value Ref Range Status   08/24/2024 225 135 - 450 K/uL Final     Lab Results   Component Value Date    CREATININE 1.2 08/24/2024    BUN 20 08/24/2024     (L) 08/24/2024    K 3.9 08/24/2024     08/24/2024    CO2 23 08/24/2024       Assessment/Plan:  1.  BRYCE on CKD 3a- baseline creatinine 1.4-1.5.  Now Estimated Creatinine Clearance: 69 mL/min (based on SCr of 1.2 mg/dL).  Improved  BRYCE probably due to decreased renal perfusion in the setting of infection plus Lasix plus ACE inhibitor.  Now better.  S/P midline placement.  No need for further IVF.  Meds reviewed.  Continue PPI.  Can continue Gabapentin TID.   2.  Moderate hyponatremia-better.  Rate of correction acceptable.  3.  Hypocalcemia-check ionized calcium  4.  History of hypertension-no need for aggressive control at this time.  At goal.   5.  History of COPD-stable  6.  DM 2-Insulin-requiring  7.  Sepsis due to Cellulitis-on ertapenem.  ID following.   8.  Stable renal status, resume Lasix, ACE inhibitor's, upon discharge  Follow-up with nephrology in 2 weeks  High complexity    Lincoln Duggan MD

## 2024-08-25 LAB
ALBUMIN SERPL-MCNC: 2.7 G/DL (ref 3.4–5)
ALBUMIN/GLOB SERPL: 0.7 {RATIO} (ref 1.1–2.2)
ALP SERPL-CCNC: 64 U/L (ref 40–129)
ALT SERPL-CCNC: 9 U/L (ref 10–40)
ANION GAP SERPL CALCULATED.3IONS-SCNC: 11 MMOL/L (ref 3–16)
ANISOCYTOSIS BLD QL SMEAR: ABNORMAL
AST SERPL-CCNC: 9 U/L (ref 15–37)
BASOPHILS # BLD: 0 K/UL (ref 0–0.2)
BASOPHILS NFR BLD: 0 %
BILIRUB SERPL-MCNC: <0.2 MG/DL (ref 0–1)
BUN SERPL-MCNC: 17 MG/DL (ref 7–20)
CALCIUM SERPL-MCNC: 8.5 MG/DL (ref 8.3–10.6)
CHLORIDE SERPL-SCNC: 103 MMOL/L (ref 99–110)
CO2 SERPL-SCNC: 25 MMOL/L (ref 21–32)
CREAT SERPL-MCNC: 1.4 MG/DL (ref 0.8–1.3)
DEPRECATED RDW RBC AUTO: 17.5 % (ref 12.4–15.4)
EOSINOPHIL # BLD: 0.1 K/UL (ref 0–0.6)
EOSINOPHIL NFR BLD: 1 %
GFR SERPLBLD CREATININE-BSD FMLA CKD-EPI: 53 ML/MIN/{1.73_M2}
GLUCOSE BLD-MCNC: 100 MG/DL (ref 70–99)
GLUCOSE BLD-MCNC: 115 MG/DL (ref 70–99)
GLUCOSE BLD-MCNC: 143 MG/DL (ref 70–99)
GLUCOSE BLD-MCNC: 151 MG/DL (ref 70–99)
GLUCOSE SERPL-MCNC: 141 MG/DL (ref 70–99)
HCT VFR BLD AUTO: 31 % (ref 40.5–52.5)
HGB BLD-MCNC: 10 G/DL (ref 13.5–17.5)
LYMPHOCYTES # BLD: 4.3 K/UL (ref 1–5.1)
LYMPHOCYTES NFR BLD: 39 %
MAGNESIUM SERPL-MCNC: 2 MG/DL (ref 1.8–2.4)
MCH RBC QN AUTO: 24.4 PG (ref 26–34)
MCHC RBC AUTO-ENTMCNC: 32.2 G/DL (ref 31–36)
MCV RBC AUTO: 75.8 FL (ref 80–100)
MONOCYTES # BLD: 0.3 K/UL (ref 0–1.3)
MONOCYTES NFR BLD: 3 %
NEUTROPHILS # BLD: 6.2 K/UL (ref 1.7–7.7)
NEUTROPHILS NFR BLD: 56 %
NEUTS BAND NFR BLD MANUAL: 1 % (ref 0–7)
PERFORMED ON: ABNORMAL
PLATELET # BLD AUTO: 249 K/UL (ref 135–450)
PLATELET BLD QL SMEAR: ADEQUATE
PMV BLD AUTO: 7.2 FL (ref 5–10.5)
POLYCHROMASIA BLD QL SMEAR: ABNORMAL
POTASSIUM SERPL-SCNC: 4.2 MMOL/L (ref 3.5–5.1)
PROT SERPL-MCNC: 6.4 G/DL (ref 6.4–8.2)
RBC # BLD AUTO: 4.09 M/UL (ref 4.2–5.9)
SLIDE REVIEW: ABNORMAL
SODIUM SERPL-SCNC: 139 MMOL/L (ref 136–145)
WBC # BLD AUTO: 10.9 K/UL (ref 4–11)

## 2024-08-25 PROCEDURE — 2060000000 HC ICU INTERMEDIATE R&B

## 2024-08-25 PROCEDURE — 6370000000 HC RX 637 (ALT 250 FOR IP): Performed by: STUDENT IN AN ORGANIZED HEALTH CARE EDUCATION/TRAINING PROGRAM

## 2024-08-25 PROCEDURE — 83735 ASSAY OF MAGNESIUM: CPT

## 2024-08-25 PROCEDURE — 80053 COMPREHEN METABOLIC PANEL: CPT

## 2024-08-25 PROCEDURE — 2580000003 HC RX 258: Performed by: NURSE PRACTITIONER

## 2024-08-25 PROCEDURE — 94761 N-INVAS EAR/PLS OXIMETRY MLT: CPT

## 2024-08-25 PROCEDURE — 85025 COMPLETE CBC W/AUTO DIFF WBC: CPT

## 2024-08-25 PROCEDURE — 6360000002 HC RX W HCPCS: Performed by: STUDENT IN AN ORGANIZED HEALTH CARE EDUCATION/TRAINING PROGRAM

## 2024-08-25 PROCEDURE — 6360000002 HC RX W HCPCS: Performed by: INTERNAL MEDICINE

## 2024-08-25 PROCEDURE — 94640 AIRWAY INHALATION TREATMENT: CPT

## 2024-08-25 PROCEDURE — 6370000000 HC RX 637 (ALT 250 FOR IP): Performed by: INTERNAL MEDICINE

## 2024-08-25 PROCEDURE — 2580000003 HC RX 258: Performed by: INTERNAL MEDICINE

## 2024-08-25 PROCEDURE — 6370000000 HC RX 637 (ALT 250 FOR IP): Performed by: NURSE PRACTITIONER

## 2024-08-25 RX ORDER — ALBUTEROL SULFATE 0.83 MG/ML
2.5 SOLUTION RESPIRATORY (INHALATION)
Status: DISCONTINUED | OUTPATIENT
Start: 2024-08-25 | End: 2024-08-27 | Stop reason: HOSPADM

## 2024-08-25 RX ORDER — ALBUTEROL SULFATE 0.83 MG/ML
2.5 SOLUTION RESPIRATORY (INHALATION) EVERY 4 HOURS PRN
Status: DISCONTINUED | OUTPATIENT
Start: 2024-08-25 | End: 2024-08-27 | Stop reason: HOSPADM

## 2024-08-25 RX ADMIN — INSULIN LISPRO 8 UNITS: 100 INJECTION, SOLUTION INTRAVENOUS; SUBCUTANEOUS at 08:57

## 2024-08-25 RX ADMIN — GABAPENTIN 300 MG: 300 CAPSULE ORAL at 21:28

## 2024-08-25 RX ADMIN — INSULIN GLARGINE 60 UNITS: 100 INJECTION, SOLUTION SUBCUTANEOUS at 21:28

## 2024-08-25 RX ADMIN — ALBUTEROL SULFATE 2.5 MG: 2.5 SOLUTION RESPIRATORY (INHALATION) at 22:29

## 2024-08-25 RX ADMIN — CLOPIDOGREL BISULFATE 75 MG: 75 TABLET ORAL at 08:43

## 2024-08-25 RX ADMIN — BUDESONIDE AND FORMOTEROL FUMARATE DIHYDRATE 2 PUFF: 160; 4.5 AEROSOL RESPIRATORY (INHALATION) at 22:30

## 2024-08-25 RX ADMIN — PANTOPRAZOLE SODIUM 40 MG: 40 INJECTION, POWDER, FOR SOLUTION INTRAVENOUS at 08:47

## 2024-08-25 RX ADMIN — ALBUTEROL SULFATE 2.5 MG: 2.5 SOLUTION RESPIRATORY (INHALATION) at 11:56

## 2024-08-25 RX ADMIN — Medication 1 CAPSULE: at 17:55

## 2024-08-25 RX ADMIN — Medication 10 ML: at 08:46

## 2024-08-25 RX ADMIN — Medication 10 ML: at 21:28

## 2024-08-25 RX ADMIN — APIXABAN 5 MG: 5 TABLET, FILM COATED ORAL at 08:43

## 2024-08-25 RX ADMIN — MUPIROCIN: 20 OINTMENT TOPICAL at 11:43

## 2024-08-25 RX ADMIN — ATORVASTATIN CALCIUM 20 MG: 20 TABLET, FILM COATED ORAL at 08:43

## 2024-08-25 RX ADMIN — APIXABAN 5 MG: 5 TABLET, FILM COATED ORAL at 21:28

## 2024-08-25 RX ADMIN — INSULIN LISPRO 8 UNITS: 100 INJECTION, SOLUTION INTRAVENOUS; SUBCUTANEOUS at 17:54

## 2024-08-25 RX ADMIN — BUDESONIDE AND FORMOTEROL FUMARATE DIHYDRATE 2 PUFF: 160; 4.5 AEROSOL RESPIRATORY (INHALATION) at 11:56

## 2024-08-25 RX ADMIN — ERTAPENEM SODIUM 1000 MG: 1 INJECTION INTRAMUSCULAR; INTRAVENOUS at 16:08

## 2024-08-25 RX ADMIN — GABAPENTIN 300 MG: 300 CAPSULE ORAL at 08:43

## 2024-08-25 RX ADMIN — TAMSULOSIN HYDROCHLORIDE 0.4 MG: 0.4 CAPSULE ORAL at 08:43

## 2024-08-25 RX ADMIN — Medication 10 ML: at 21:29

## 2024-08-25 RX ADMIN — Medication 1 CAPSULE: at 08:43

## 2024-08-25 RX ADMIN — ACETAMINOPHEN 650 MG: 325 TABLET ORAL at 21:44

## 2024-08-25 RX ADMIN — GABAPENTIN 300 MG: 300 CAPSULE ORAL at 16:04

## 2024-08-25 ASSESSMENT — PAIN SCALES - GENERAL
PAINLEVEL_OUTOF10: 4
PAINLEVEL_OUTOF10: 0
PAINLEVEL_OUTOF10: 4
PAINLEVEL_OUTOF10: 2
PAINLEVEL_OUTOF10: 4
PAINLEVEL_OUTOF10: 4

## 2024-08-25 ASSESSMENT — PAIN SCALES - WONG BAKER
WONGBAKER_NUMERICALRESPONSE: HURTS LITTLE MORE
WONGBAKER_NUMERICALRESPONSE: HURTS LITTLE MORE
WONGBAKER_NUMERICALRESPONSE: NO HURT
WONGBAKER_NUMERICALRESPONSE: HURTS LITTLE MORE
WONGBAKER_NUMERICALRESPONSE: NO HURT
WONGBAKER_NUMERICALRESPONSE: NO HURT

## 2024-08-25 ASSESSMENT — PAIN DESCRIPTION - DESCRIPTORS
DESCRIPTORS: ACHING;THROBBING
DESCRIPTORS: SHARP

## 2024-08-25 ASSESSMENT — PAIN DESCRIPTION - ORIENTATION
ORIENTATION: LEFT
ORIENTATION: LEFT

## 2024-08-25 ASSESSMENT — PAIN DESCRIPTION - LOCATION
LOCATION: FOOT
LOCATION: FOOT

## 2024-08-25 ASSESSMENT — PAIN DESCRIPTION - ONSET: ONSET: ON-GOING

## 2024-08-25 ASSESSMENT — PAIN - FUNCTIONAL ASSESSMENT
PAIN_FUNCTIONAL_ASSESSMENT: ACTIVITIES ARE NOT PREVENTED
PAIN_FUNCTIONAL_ASSESSMENT: PREVENTS OR INTERFERES SOME ACTIVE ACTIVITIES AND ADLS

## 2024-08-25 ASSESSMENT — PAIN DESCRIPTION - FREQUENCY: FREQUENCY: CONTINUOUS

## 2024-08-25 ASSESSMENT — PAIN DESCRIPTION - PAIN TYPE: TYPE: CHRONIC PAIN

## 2024-08-25 NOTE — PROGRESS NOTES
Pt up in the chair for first part of the shift.  All night medications given.  Pt continues in contact precautions.   External cath in place and pt is clean and dry.  Assessment completed and VSS.     Assisted pt back to the bed. Changed his brief and pajama pants as the external cath had leaked. New external cath given and pt made comfortable in the bed.    Will continue to monitor.  GABRIEL Lees RN

## 2024-08-25 NOTE — PROGRESS NOTES
[facility] Note    Patient Active Problem List   Diagnosis    Sepsis due to cellulitis (HCC)    COPD exacerbation (HCC)    Acute on chronic respiratory failure with hypoxia (HCC)    Acute kidney injury superimposed on chronic kidney disease (HCC)    Elevated lactic acid level    Elevated troponin    Hypomagnesemia    Hypophosphatemia    Type 2 diabetes mellitus (HCC)    Obesity with sleep apnea    CAD (coronary artery disease)    Septicemia (HCC)    Elevated sed rate    CRP elevated    Cellulitis of left leg    Lactic acidosis    Diabetes education, encounter for    Gram-negative infection    Bacterial infection due to Streptococcus, group G    Receiving intravenous antibiotic treatment as outpatient       Past Medical History:   has a past medical history of COPD (chronic obstructive pulmonary disease) (HCC), Diabetes mellitus (HCC), Hyperlipidemia, and Hypertension.    Past Social History:   reports that he has been smoking cigarettes. He has never used smokeless tobacco. He reports that he does not drink alcohol and does not use drugs.    Subjective:    Noted resting comfortably in room   has bilateral leg swelling.  Left foot pain.        Objective:      /60   Pulse 92   Temp 97.5 °F (36.4 °C) (Oral)   Resp 17   Ht 1.727 m (5' 8\")   Wt 120.3 kg (265 lb 4.8 oz)   SpO2 94%   BMI 40.34 kg/m²     Wt Readings from Last 3 Encounters:   08/21/24 120.3 kg (265 lb 4.8 oz)   01/01/22 117.9 kg (260 lb)   10/23/17 112.5 kg (248 lb)       BP Readings from Last 3 Encounters:   08/25/24 100/60   01/01/22 (!) 107/54   10/23/17 (!) 178/118     Chest- clear  Heart-regular  Abd-soft  Ext-1+ edema    Labs  Hemoglobin   Date Value Ref Range Status   08/25/2024 10.0 (L) 13.5 - 17.5 g/dL Final     Hematocrit   Date Value Ref Range Status   08/25/2024 31.0 (L) 40.5 - 52.5 % Final     WBC   Date Value Ref Range Status   08/25/2024 10.9 4.0 - 11.0 K/uL Final

## 2024-08-25 NOTE — PROGRESS NOTES
08/25/24 0113   RT Protocol   History Pulmonary Disease 2   Respiratory pattern 0   Breath sounds 2   Cough 0   Indications for Bronchodilator Therapy On home bronchodilators   Bronchodilator Assessment Score 4

## 2024-08-25 NOTE — PROGRESS NOTES
Admit Date: 8/18/2024  Diet: ADULT DIET; Regular; 3 carb choices (45 gm/meal)    CC: LE cellulitis    Interval history:   Overnight, there were no acute events.  Patient's vitals remained stable.    Patient was seen this morning. I discussed the plan of the day with him in detail. All questions were addressed and answered to patient's satisfaction. Patient denies fevers, chills, nausea, vomiting, chest pain, shortness of breath, diarrhea, constipation, dysuria, urinary frequency or urgency.     Plan:     -Pt is discharged  -Per ID, continue IV Invanz through 9/6/24    Assessment:   Gamal Blue is a 73 y.o. male with PMH of COPD, type 2 diabetes, hypertension, VTE, CAD, obesity with sleep apnea on CPAP, hyperlipidemia, stage III CKD with baseline creatinine of 1.6-1.7  who was admitted with LE Cellulitis      Sepsis due to cellulitis (HCC)    Left lower extremity cellulitis  Left lower extremity with erythema over the shin as well as the foot with disclamation of the skin on the toes, fluctuance noted.  Concerning for abscess.  - ID consult  - Podiatry consult  - Pain management       COPD exacerbation (HCC)    Acute on chronic respiratory failure with hypoxia (HCC)  On presentation, patient had desaturations of 85% on room air.  At baseline he reports not to be on oxygen and uses BiPAP at night.  - Will continue breathing therapy with cardiopulmonary protocol  - Continue systemic steroids  - O2 supplementation with target sats greater than 90%       Acute kidney injury superimposed on chronic kidney disease (HCC): In the setting of sepsis.  Baseline creatinine around 1.6.  Peak creatinine at 2.2.  Improved with IV hydration.  - Stop IV fluids and monitor off fluids  - Monitor renal function and electrolytes    Type 2 diabetes mellitus (HCC): On sliding scale insulin with glargine.  Closely monitor glycemic levels with adjustment on steroids      Obesity with sleep apnea: On CPAP at night      CAD (coronary  Labs     08/23/24  0539 08/24/24  0412 08/25/24  0546   * 135* 139   K 4.1 3.9 4.2    103 103   CO2 22 23 25   BUN 28* 20 17   CREATININE 1.4* 1.2 1.4*   GLUCOSE 165* 171* 141*   CALCIUM 8.1* 7.9* 8.5   MG 2.20 2.10 2.00   ANIONGAP 11 9 11     Hepatic:   Recent Labs     08/23/24  0539 08/24/24  0412 08/25/24  0546   AST 14* 9* 9*   ALT 12 11 9*   BILITOT <0.2 <0.2 <0.2   ALKPHOS 62 60 64     Troponin: No results for input(s): \"TROPONINI\" in the last 72 hours.  BNP: No results for input(s): \"BNP\" in the last 72 hours.  Lipids: No results for input(s): \"CHOL\", \"HDL\" in the last 72 hours.    Invalid input(s): \"LDLCALCU\", \"TRIGLYCERIDE\"  ABGs:  No results for input(s): \"PHART\", \"KGU7ABS\", \"PO2ART\", \"XKE8NPN\", \"BEART\", \"THGBART\", \"T7BXWBOH\", \"PWV6YJB\" in the last 72 hours.    INR:   No results for input(s): \"INR\" in the last 72 hours.    Lactate: No results for input(s): \"LACTATE\" in the last 72 hours.  Cultures:  -----------------------------------------------------------------  RAD:   MRI FOOT LEFT WO CONTRAST   Final Result   1.  No evidence of osteomyelitis or abscess.      2.  Significant soft tissue swelling over the dorsum of the foot         CT CHEST PULMONARY EMBOLISM W CONTRAST   Final Result   No pulmonary embolism.  Emphysema without acute pulmonary abnormality.         CT HEAD WO CONTRAST   Final Result   No acute intracranial abnormality is identified.         XR CHEST PORTABLE   Final Result   Bilateral patchy interstitial opacities, concerning for pulmonary edema or   multifocal pneumonia.             Medications:     Scheduled Meds:   albuterol  2.5 mg Nebulization BID RT    ertapenem (INVanz) 1,000 mg in sodium chloride 0.9 % 50 mL IVPB  1,000 mg IntraVENous Q24H    sodium chloride flush  5-40 mL IntraVENous 2 times per day    lidocaine 1 % injection  50 mg IntraDERmal Once    insulin lispro  8 Units SubCUTAneous TID WC    insulin lispro  0-16 Units SubCUTAneous TID WC    insulin lispro  0-4

## 2024-08-26 LAB
ALBUMIN SERPL-MCNC: 2.9 G/DL (ref 3.4–5)
ANION GAP SERPL CALCULATED.3IONS-SCNC: 11 MMOL/L (ref 3–16)
BUN SERPL-MCNC: 17 MG/DL (ref 7–20)
CALCIUM SERPL-MCNC: 8.5 MG/DL (ref 8.3–10.6)
CHLORIDE SERPL-SCNC: 103 MMOL/L (ref 99–110)
CO2 SERPL-SCNC: 25 MMOL/L (ref 21–32)
CREAT SERPL-MCNC: 1.2 MG/DL (ref 0.8–1.3)
GFR SERPLBLD CREATININE-BSD FMLA CKD-EPI: 64 ML/MIN/{1.73_M2}
GLUCOSE BLD-MCNC: 103 MG/DL (ref 70–99)
GLUCOSE BLD-MCNC: 146 MG/DL (ref 70–99)
GLUCOSE BLD-MCNC: 150 MG/DL (ref 70–99)
GLUCOSE BLD-MCNC: 318 MG/DL (ref 70–99)
GLUCOSE SERPL-MCNC: 91 MG/DL (ref 70–99)
PERFORMED ON: ABNORMAL
PHOSPHATE SERPL-MCNC: 4.4 MG/DL (ref 2.5–4.9)
POTASSIUM SERPL-SCNC: 4.2 MMOL/L (ref 3.5–5.1)
SODIUM SERPL-SCNC: 139 MMOL/L (ref 136–145)

## 2024-08-26 PROCEDURE — 97535 SELF CARE MNGMENT TRAINING: CPT

## 2024-08-26 PROCEDURE — 6370000000 HC RX 637 (ALT 250 FOR IP): Performed by: NURSE PRACTITIONER

## 2024-08-26 PROCEDURE — 6370000000 HC RX 637 (ALT 250 FOR IP): Performed by: INTERNAL MEDICINE

## 2024-08-26 PROCEDURE — 2580000003 HC RX 258: Performed by: NURSE PRACTITIONER

## 2024-08-26 PROCEDURE — 97116 GAIT TRAINING THERAPY: CPT

## 2024-08-26 PROCEDURE — 94640 AIRWAY INHALATION TREATMENT: CPT

## 2024-08-26 PROCEDURE — 99232 SBSQ HOSP IP/OBS MODERATE 35: CPT | Performed by: INTERNAL MEDICINE

## 2024-08-26 PROCEDURE — 2700000000 HC OXYGEN THERAPY PER DAY

## 2024-08-26 PROCEDURE — 6370000000 HC RX 637 (ALT 250 FOR IP): Performed by: STUDENT IN AN ORGANIZED HEALTH CARE EDUCATION/TRAINING PROGRAM

## 2024-08-26 PROCEDURE — 6360000002 HC RX W HCPCS: Performed by: STUDENT IN AN ORGANIZED HEALTH CARE EDUCATION/TRAINING PROGRAM

## 2024-08-26 PROCEDURE — 97530 THERAPEUTIC ACTIVITIES: CPT

## 2024-08-26 PROCEDURE — 6360000002 HC RX W HCPCS: Performed by: INTERNAL MEDICINE

## 2024-08-26 PROCEDURE — 94660 CPAP INITIATION&MGMT: CPT

## 2024-08-26 PROCEDURE — 80069 RENAL FUNCTION PANEL: CPT

## 2024-08-26 PROCEDURE — 2060000000 HC ICU INTERMEDIATE R&B

## 2024-08-26 PROCEDURE — 94761 N-INVAS EAR/PLS OXIMETRY MLT: CPT

## 2024-08-26 PROCEDURE — 2580000003 HC RX 258: Performed by: INTERNAL MEDICINE

## 2024-08-26 RX ORDER — TORSEMIDE 20 MG/1
20 TABLET ORAL DAILY
Status: DISCONTINUED | OUTPATIENT
Start: 2024-08-26 | End: 2024-08-27 | Stop reason: HOSPADM

## 2024-08-26 RX ORDER — LISINOPRIL 10 MG/1
10 TABLET ORAL DAILY
Status: DISCONTINUED | OUTPATIENT
Start: 2024-08-26 | End: 2024-08-27

## 2024-08-26 RX ADMIN — LISINOPRIL 10 MG: 10 TABLET ORAL at 12:47

## 2024-08-26 RX ADMIN — INSULIN LISPRO 8 UNITS: 100 INJECTION, SOLUTION INTRAVENOUS; SUBCUTANEOUS at 17:49

## 2024-08-26 RX ADMIN — ERTAPENEM SODIUM 1000 MG: 1 INJECTION INTRAMUSCULAR; INTRAVENOUS at 13:15

## 2024-08-26 RX ADMIN — Medication 10 ML: at 20:29

## 2024-08-26 RX ADMIN — TRAMADOL HYDROCHLORIDE 50 MG: 50 TABLET ORAL at 20:29

## 2024-08-26 RX ADMIN — GABAPENTIN 300 MG: 300 CAPSULE ORAL at 16:44

## 2024-08-26 RX ADMIN — INSULIN GLARGINE 60 UNITS: 100 INJECTION, SOLUTION SUBCUTANEOUS at 20:28

## 2024-08-26 RX ADMIN — ACETAMINOPHEN 650 MG: 325 TABLET ORAL at 23:53

## 2024-08-26 RX ADMIN — Medication 1 CAPSULE: at 17:50

## 2024-08-26 RX ADMIN — ALBUTEROL SULFATE 2.5 MG: 2.5 SOLUTION RESPIRATORY (INHALATION) at 22:10

## 2024-08-26 RX ADMIN — APIXABAN 5 MG: 5 TABLET, FILM COATED ORAL at 20:28

## 2024-08-26 RX ADMIN — Medication 10 ML: at 08:59

## 2024-08-26 RX ADMIN — BUDESONIDE AND FORMOTEROL FUMARATE DIHYDRATE 2 PUFF: 160; 4.5 AEROSOL RESPIRATORY (INHALATION) at 22:14

## 2024-08-26 RX ADMIN — GABAPENTIN 300 MG: 300 CAPSULE ORAL at 20:29

## 2024-08-26 RX ADMIN — PANTOPRAZOLE SODIUM 40 MG: 40 INJECTION, POWDER, FOR SOLUTION INTRAVENOUS at 08:59

## 2024-08-26 RX ADMIN — ALBUTEROL SULFATE 2.5 MG: 2.5 SOLUTION RESPIRATORY (INHALATION) at 08:26

## 2024-08-26 RX ADMIN — TORSEMIDE 20 MG: 20 TABLET ORAL at 10:19

## 2024-08-26 RX ADMIN — CLOPIDOGREL BISULFATE 75 MG: 75 TABLET ORAL at 08:44

## 2024-08-26 RX ADMIN — Medication 1 CAPSULE: at 08:43

## 2024-08-26 RX ADMIN — INSULIN LISPRO 12 UNITS: 100 INJECTION, SOLUTION INTRAVENOUS; SUBCUTANEOUS at 17:49

## 2024-08-26 RX ADMIN — APIXABAN 5 MG: 5 TABLET, FILM COATED ORAL at 08:44

## 2024-08-26 RX ADMIN — GABAPENTIN 300 MG: 300 CAPSULE ORAL at 08:43

## 2024-08-26 RX ADMIN — MUPIROCIN: 20 OINTMENT TOPICAL at 09:03

## 2024-08-26 RX ADMIN — TAMSULOSIN HYDROCHLORIDE 0.4 MG: 0.4 CAPSULE ORAL at 08:44

## 2024-08-26 RX ADMIN — Medication 10 ML: at 20:28

## 2024-08-26 RX ADMIN — ATORVASTATIN CALCIUM 20 MG: 20 TABLET, FILM COATED ORAL at 08:44

## 2024-08-26 RX ADMIN — INSULIN LISPRO 8 UNITS: 100 INJECTION, SOLUTION INTRAVENOUS; SUBCUTANEOUS at 12:47

## 2024-08-26 RX ADMIN — BUDESONIDE AND FORMOTEROL FUMARATE DIHYDRATE 2 PUFF: 160; 4.5 AEROSOL RESPIRATORY (INHALATION) at 08:26

## 2024-08-26 ASSESSMENT — ENCOUNTER SYMPTOMS
EYE DISCHARGE: 0
CONSTIPATION: 0
BACK PAIN: 0
WHEEZING: 0
COUGH: 0
SINUS PAIN: 0
SHORTNESS OF BREATH: 0
SINUS PRESSURE: 0
RHINORRHEA: 0
NAUSEA: 0
ABDOMINAL PAIN: 0
EYE REDNESS: 0
SORE THROAT: 0
DIARRHEA: 0

## 2024-08-26 ASSESSMENT — PAIN SCALES - WONG BAKER: WONGBAKER_NUMERICALRESPONSE: NO HURT

## 2024-08-26 ASSESSMENT — PAIN SCALES - GENERAL
PAINLEVEL_OUTOF10: 0
PAINLEVEL_OUTOF10: 4
PAINLEVEL_OUTOF10: 5

## 2024-08-26 NOTE — CARE COORDINATION
Discharge Planning:     (CM) called [facility] pl[facility]([phone]) to get the IV abx coverage information. JAKE LVM with [facility] awaiting callback.      Electronically signed by DESTINY Lancaster on 8/26/2024 at 9:09 AM      UPDATE @ 1138      CM received a call from Anne Marie with the [facility]. Anne Marie stated her team is working on getting the IV abx approval and will call CM when they have an update.      Electronically signed by DESTINY Lancaster on 8/26/2024 at 11:35 AM        UPDATE @ 1616    CM received a call from Anne [facility] with the [facility]. Anne Marie stated that the IV abx should be approved by tomorrow morning so pt can d/c tomorrow. CM team to follow up with Anne Marie tomorrow before pt is d/c to confirm the IV abx will be delivered.          Electronically signed by DESTINY Lancaster on 8/26/2024 at 4:18 PM

## 2024-08-26 NOTE — PROGRESS NOTES
severe reaction, worsening diarrhea, break through fever etc.  Discussed patient's condition and what to expect. All of the patient's questions were addressed in a satisfactory manner and patient verbalized understanding all instructions.      Level of complexity of visit and medical decision making: High       Thank you for involving me in the care of your patient. I will continue to follow. If you have anyadditional questions, please do not hesitate to contact me.        Subjective:     Interval history: Interval history was obtained from chart review and patient/ RN.  The patient was seen and examined at bedside today.  He is afebrile.  He is tolerating the antibiotics okay.  No diarrhea    REVIEW OF SYSTEMS:     Review of Systems   Constitutional:  Negative for chills, diaphoresis and fever.   HENT:  Negative for ear discharge, ear pain, postnasal drip, rhinorrhea, sinus pressure, sinus pain and sore throat.    Eyes:  Negative for discharge and redness.   Respiratory:  Negative for cough, shortness of breath and wheezing.    Cardiovascular:  Negative for chest pain and leg swelling.   Gastrointestinal:  Negative for abdominal pain, constipation, diarrhea and nausea.   Endocrine: Negative for cold intolerance, heat intolerance and polydipsia.   Genitourinary:  Negative for dysuria, flank pain, frequency, hematuria and urgency.   Musculoskeletal:  Negative for back pain and myalgias.   Skin:  Negative for rash.   Allergic/Immunologic: Negative for immunocompromised state.   Neurological:  Negative for dizziness, seizures and headaches.   Hematological:  Does not bruise/bleed easily.   Psychiatric/Behavioral:  Negative for agitation, hallucinations and suicidal ideas. The patient is not nervous/anxious.    All other systems reviewed and are negative.        Past Medical History: All past medical history reviewed today.    Past Medical History:   Diagnosis Date    COPD (chronic obstructive pulmonary disease) (HCC)   (rDNA), dextrose, traMADol      Problem list:       Patient Active Problem List   Diagnosis Code    Sepsis due to cellulitis (HCC) L03.90, A41.9    COPD exacerbation (HCC) J44.1    Acute on chronic respiratory failure with hypoxia (HCC) J96.21    Acute kidney injury superimposed on chronic kidney disease (HCC) N17.9, N18.9    Elevated lactic acid level R79.89    Elevated troponin R79.89    Hypomagnesemia E83.42    Hypophosphatemia E83.39    Type 2 diabetes mellitus (HCC) E11.9    Obesity with sleep apnea G47.30, E66.9    CAD (coronary artery disease) I25.10    Septicemia (HCC) A41.9    Elevated sed rate R70.0    CRP elevated R79.82    Cellulitis of left leg L03.116    Lactic acidosis E87.20    Diabetes education, encounter for Z71.89    Gram-negative infection A49.9    Bacterial infection due to Streptococcus, group G B95.4    Receiving intravenous antibiotic treatment as outpatient Z79.2       Please note that this chart was generated using Dragon dictation software. Although every effort was made to ensure the accuracy of this automated transcription, some errors in transcription may have occurred inadvertently. If you may need any clarification, please do not hesitate to contact me through EPIC or at the phone number provided below with my electronic signature.  Any pictures or media included in this note were obtained after taking informed verbal consent from the patient and with their approval to include those in the patient's medical record.        Eleno Fried MD, MPH, FACP, FID  8/26/2024, 2:47 PM  Central Office Phone: [phone]  Central Office Fax: [phone]    [facility]   [address], [address] ([address])  [place] [address]  [facility] days:  Tuesday & Thursday AM    [facility]  [address] , [address] (Medical office Building)  [place], [place], [address]  [facility] days: Wednesday AM

## 2024-08-26 NOTE — PROGRESS NOTES
[facility] Note    Patient Active Problem List   Diagnosis    Sepsis due to cellulitis (HCC)    COPD exacerbation (HCC)    Acute on chronic respiratory failure with hypoxia (HCC)    Acute kidney injury superimposed on chronic kidney disease (HCC)    Elevated lactic acid level    Elevated troponin    Hypomagnesemia    Hypophosphatemia    Type 2 diabetes mellitus (HCC)    Obesity with sleep apnea    CAD (coronary artery disease)    Septicemia (HCC)    Elevated sed rate    CRP elevated    Cellulitis of left leg    Lactic acidosis    Diabetes education, encounter for    Gram-negative infection    Bacterial infection due to Streptococcus, group G    Receiving intravenous antibiotic treatment as outpatient       Past Medical History:   has a past medical history of COPD (chronic obstructive pulmonary disease) (HCC), Diabetes mellitus (HCC), Hyperlipidemia, and Hypertension.    Past Social History:   reports that he has been smoking cigarettes. He has never used smokeless tobacco. He reports that he does not drink alcohol and does not use drugs.    Subjective:    Noted resting comfortably in room   Has bilateral leg swelling.   Good urine output      Objective:      BP (!) 153/83   Pulse (!) 101   Temp 97.4 °F (36.3 °C) (Temporal)   Resp 16   Ht 1.727 m (5' 8\")   Wt 116.2 kg (256 lb 3.2 oz)   SpO2 93%   BMI 38.96 kg/m²     Wt Readings from Last 3 Encounters:   08/26/24 116.2 kg (256 lb 3.2 oz)   01/01/22 117.9 kg (260 lb)   10/23/17 112.5 kg (248 lb)       BP Readings from Last 3 Encounters:   08/26/24 (!) 153/83   01/01/22 (!) 107/54   10/23/17 (!) 178/118     Chest- clear  Heart-regular  Abd-soft  Ext-1+ edema    Labs  Hemoglobin   Date Value Ref Range Status   08/25/2024 10.0 (L) 13.5 - 17.5 g/dL Final     Hematocrit   Date Value Ref Range Status   08/25/2024 31.0 (L) 40.5 - 52.5 % Final     WBC   Date Value Ref Range Status   08/25/2024 10.9 4.0 - 11.0

## 2024-08-26 NOTE — PROGRESS NOTES
Admit Date: 8/18/2024  Diet: ADULT DIET; Regular; 3 carb choices (45 gm/meal)    CC: LE cellulitis    Interval history:   Overnight, there were no acute events.  Patient's vitals remained stable.    Patient was seen this morning. I discussed the plan of the day with him in detail. All questions were addressed and answered to patient's satisfaction. Patient denies fevers, chills, nausea, vomiting, chest pain, shortness of breath, diarrhea, constipation, dysuria, urinary frequency or urgency.     Plan:     -Pt is discharged  -Per ID, continue IV Invanz through 9/6/24    Assessment:   Gamal Blue is a 73 y.o. male with PMH of COPD, type 2 diabetes, hypertension, VTE, CAD, obesity with sleep apnea on CPAP, hyperlipidemia, stage III CKD with baseline creatinine of 1.6-1.7  who was admitted with LE Cellulitis      Sepsis due to cellulitis (HCC)    Left lower extremity cellulitis  Left lower extremity with erythema over the shin as well as the foot with disclamation of the skin on the toes, fluctuance noted.  Concerning for abscess.  - ID consult  - Podiatry consult  - Pain management       COPD exacerbation (HCC)    Acute on chronic respiratory failure with hypoxia (HCC)  On presentation, patient had desaturations of 85% on room air.  At baseline he reports not to be on oxygen and uses BiPAP at night.  - Will continue breathing therapy with cardiopulmonary protocol  - Continue systemic steroids  - O2 supplementation with target sats greater than 90%       Acute kidney injury superimposed on chronic kidney disease (HCC): In the setting of sepsis.  Baseline creatinine around 1.6.  Peak creatinine at 2.2.  Improved with IV hydration.  - Stop IV fluids and monitor off fluids  - Monitor renal function and electrolytes    Type 2 diabetes mellitus (HCC): On sliding scale insulin with glargine.  Closely monitor glycemic levels with adjustment on steroids      Obesity with sleep apnea: On CPAP at night      CAD (coronary      insulin lispro  0-16 Units SubCUTAneous TID WC    insulin lispro  0-4 Units SubCUTAneous Nightly    pantoprazole  40 mg IntraVENous Daily    lactobacillus  1 capsule Oral BID WC    mupirocin   Topical Daily    apixaban  5 mg Oral BID    atorvastatin  20 mg Oral Daily    budesonide-formoterol  2 puff Inhalation BID    clopidogrel  75 mg Oral Daily    tamsulosin  0.4 mg Oral Daily    sodium chloride flush  5-40 mL IntraVENous 2 times per day    nicotine  1 patch TransDERmal Daily    gabapentin  300 mg Oral TID    insulin glargine  60 Units SubCUTAneous Nightly     Continuous Infusions:   sodium chloride      sodium chloride 100 mL/hr at 08/23/24 0516    dextrose       PRN Meds:albuterol, sodium chloride flush, sodium chloride, labetalol, sodium chloride flush, sodium chloride, magnesium sulfate, ondansetron **OR** ondansetron, polyethylene glycol, acetaminophen **OR** acetaminophen, sodium phosphate 15 mmol in sodium chloride 0.9 % 250 mL IVPB, dextrose bolus **OR** dextrose bolus, glucagon (rDNA), dextrose, traMADol

## 2024-08-26 NOTE — PROGRESS NOTES
[facility] - Inpatient Rehabilitation Department   Phone: [phone]    Physical Therapy    [] Initial Evaluation            [x] Daily Treatment Note         [] Discharge Summary      Patient: Gamal Blue   : 1950   MRN: [id number]   Date of Service:  2024  Admitting Diagnosis: Sepsis due to cellulitis (HCC)    Current Admission Summary: Gamal Blue is a(n) 73 y.o. male with past medical history as below   who arrives via EMS for recurrent falls.  Patient states that he was unable to get out of bed today due to generalized fatigue.  States that he has been on an oral antibiotic for cellulitis of the left lower extremity, but symptoms not improved affirms shortness of breath.  States that he does not wear oxygen at home.  He does affirm a history of a DVT that he used to take blood thinners for but states he does not take them anymore     Past Medical History:  has a past medical history of COPD (chronic obstructive pulmonary disease) (HCC), Diabetes mellitus (HCC), Hyperlipidemia, and Hypertension.  Past Surgical History:  has a past surgical history that includes Appendectomy.    Discharge Recommendations: Gamal Blue scored a 17/24 on the AM-PAC short mobility form. Current research shows that an AM-PAC score of 17 or less is typically not associated with a discharge to the patient's home setting. Based on the patient's AM-PAC score and their current functional mobility deficits, it is recommended that the patient have 5-7 sessions per week of Physical Therapy at d/c to increase the patient's independence.  At this time, this patient demonstrates complex nursing, medical, and rehabilitative needs, and would benefit from intensive rehabilitation services upon discharge from the Inpatient setting.  This patient demonstrates the ability to participate in and benefit from an intensive therapy program with a coordinated interdisciplinary team approach to foster frequent, structured,  decreased functional mobility, decreased ROM, decreased strength, decreased endurance, decreased balance  Prognosis: good  Clinical Assessment: Patient reports feeling much better but also reports he feels weaker after being in the hospital for a week.  He demonstrates balance and endurance deficits and would benefit from continued therapy at d/c.  His spouse is currently in a SNF and he has no one to help him at home.  Recommend 24 hour assist at d/c.    Safety Interventions: patient left in chair, chair alarm in place, call light within reach, patient at risk for falls, and nurse notified    Plan  Frequency: 3-5 x/per week  Current Treatment Recommendations: strengthening, balance training, functional mobility training, transfer training, gait training, endurance training, safety education, and equipment evaluation/education    Goals  Patient Goals: Does not state.  Short Term Goals:  Time Frame: discharge  Patient will complete bed mobility at supervision   Patient will complete transfers at stand by assistance   Patient will ambulate 25 ft with use of LRAD at contact guard assistance    Above goals reviewed on 8/26/2024.  All goals are ongoing at this time unless indicated above.      Therapy Session Time      Individual Group Co-treatment   Time In 1425       Time Out 1505       Minutes 40         Timed Code Treatment Minutes:  40  Total Treatment Minutes:  40       Electronically Signed By: Kannan Chowdhury PT, DPT, ATC-R [id number]

## 2024-08-26 NOTE — PROGRESS NOTES
[facility] - Inpatient Rehabilitation Department   Phone: [phone]    Occupational Therapy    [] Initial Evaluation            [x] Daily Treatment Note         [] Discharge Summary      Patient: Gamal Blue   : 1950   MRN: [id number]   Date of Service:  2024    Admitting Diagnosis:  Sepsis due to cellulitis (HCC)  Current Admission Summary: Per H&P \"Gamal Blue is a(n) 73 y.o. male with past medical history as below   who arrives via EMS for recurrent falls.  Patient states that he was unable to get out of bed today due to generalized fatigue.  States that he has been on an oral antibiotic for cellulitis of the left lower extremity, but symptoms not improved affirms shortness of breath.  States that he does not wear oxygen at home.  He does affirm a history of a DVT that he used to take blood thinners for but states he does not take them anymore \"  Past Medical History:  has a past medical history of COPD (chronic obstructive pulmonary disease) (HCC), Diabetes mellitus (HCC), Hyperlipidemia, and Hypertension.  Past Surgical History:  has a past surgical history that includes Appendectomy.    Discharge Recommendations: Gamal Blue scored a 16/24 on the AM-PAC ADL Inpatient form. Current research shows that an AM-PAC score of 17 or less is typically not associated with a discharge to the patient's home setting. Based on the patient's AM-PAC score and their current ADL deficits, it is recommended that the patient have 5-7 sessions per week of Occupational Therapy at d/c to increase the patient's independence.  At this time, this patient demonstrates complex nursing, medical, and rehabilitative needs, and would benefit from intensive rehabilitation services upon discharge from the Inpatient setting.  This patient demonstrates the ability to participate in and benefit from an intensive therapy program with a coordinated interdisciplinary team approach to foster frequent, structured,  strength grossly WFL      Subjective  General: Pt seated in recliner upon arrival, agreeable to tx  Pain: 4/10.  Location: BLE, reports feeling better than on admission  Pain Interventions: repositioned  and therapy activities modified        Activities of Daily Living  Basic Activities of Daily Living  Grooming: minimal assistance  Grooming Comments: A for thoroughness when shaving face, able to complete majority of task in stance at sink  Lower Extremity Dressing: dependent  Dressing Comments: depends change  Toileting: dependent.    Toileting Comments: LEOPOLDO espana for pericare and clothing mgmt s/p BM  Instrumental Activities of Daily Living  No IADL completed on this date.    Functional Mobility  Bed Mobility:  Bed mobility not completed on this date.  Comments: pt on BSC at Sos and in recliner chair at EOS  Transfers:  Sit to stand transfer:contact guard assistance  Stand to sit transfer: contact guard assistance  Toilet transfer: contact guard assistance  Toilet transfer equipment: standard bedside commode  Comments: All t/f completed from/to RW  Functional Mobility  Functional Mobility Activity: to/from bathroom, 20' in room  Device Use: rolling walker  Required Assistance: contact guard assistance  Balance:  Static Sitting Balance: fair (+): maintains balance at SBA/supervision without use of UE support  Dynamic Sitting Balance: fair (+): maintains balance at SBA/supervision without use of UE support  Static Standing Balance: fair (-): maintains balance at CGA with use of UE support  Dynamic Standing Balance: fair (-): maintains balance at CGA with use of UE support  Comments:    Other Therapeutic Interventions    Functional Outcomes                 Cognition  WFL  Orientation:    alert and oriented x 4  Command Following:   WFL     Education  Barriers To Learning: none  Patient Education: patient educated on goals, OT role and benefits, plan of care, transfer training, discharge recommendations  Learning

## 2024-08-26 NOTE — PROGRESS NOTES
CLINICAL PHARMACY NOTE: MEDS TO BEDS    Total # of Prescriptions Filled: 3   The following medications were delivered to the patient:  GABAPENTIN 300MG CAPS  MUPIROCIN 2% OINT  ACIDOPHILUS PROBIOTIC 10MG CAPS    Additional Documentation: Grecia JOSEPH approved to  deliver medications to patient room=signed  14 days were given- get the rest through [facility]

## 2024-08-26 NOTE — CARE COORDINATION
08/26/24 1212   IMM Letter   IMM Letter given to Patient/Family/Significant other/Guardian/POA/by: IMM given by CM   IMM Letter date given: 08/26/24   IMM Letter time given: 0947        Dr. Jefferson Dr. Díaz

## 2024-08-27 ENCOUNTER — CLINICAL DOCUMENTATION (OUTPATIENT)
Dept: INFECTIOUS DISEASES | Age: 74
End: 2024-08-27

## 2024-08-27 VITALS
TEMPERATURE: 97.7 F | OXYGEN SATURATION: 90 % | BODY MASS INDEX: 38.49 KG/M2 | HEART RATE: 88 BPM | SYSTOLIC BLOOD PRESSURE: 100 MMHG | HEIGHT: 68 IN | RESPIRATION RATE: 18 BRPM | WEIGHT: 254 LBS | DIASTOLIC BLOOD PRESSURE: 62 MMHG

## 2024-08-27 PROBLEM — R79.89 ELEVATED LACTIC ACID LEVEL: Status: RESOLVED | Noted: 2024-08-19 | Resolved: 2024-08-27

## 2024-08-27 PROBLEM — E83.42 HYPOMAGNESEMIA: Status: RESOLVED | Noted: 2024-08-19 | Resolved: 2024-08-27

## 2024-08-27 PROBLEM — E83.39 HYPOPHOSPHATEMIA: Status: RESOLVED | Noted: 2024-08-19 | Resolved: 2024-08-27

## 2024-08-27 PROBLEM — E87.20 LACTIC ACIDOSIS: Status: RESOLVED | Noted: 2024-08-19 | Resolved: 2024-08-27

## 2024-08-27 LAB
ALBUMIN SERPL-MCNC: 2.9 G/DL (ref 3.4–5)
ANION GAP SERPL CALCULATED.3IONS-SCNC: 11 MMOL/L (ref 3–16)
BUN SERPL-MCNC: 23 MG/DL (ref 7–20)
CALCIUM SERPL-MCNC: 8.2 MG/DL (ref 8.3–10.6)
CHLORIDE SERPL-SCNC: 100 MMOL/L (ref 99–110)
CO2 SERPL-SCNC: 26 MMOL/L (ref 21–32)
CREAT SERPL-MCNC: 1.5 MG/DL (ref 0.8–1.3)
GFR SERPLBLD CREATININE-BSD FMLA CKD-EPI: 49 ML/MIN/{1.73_M2}
GLUCOSE BLD-MCNC: 133 MG/DL (ref 70–99)
GLUCOSE BLD-MCNC: 65 MG/DL (ref 70–99)
GLUCOSE BLD-MCNC: 81 MG/DL (ref 70–99)
GLUCOSE SERPL-MCNC: 77 MG/DL (ref 70–99)
PERFORMED ON: ABNORMAL
PERFORMED ON: ABNORMAL
PERFORMED ON: NORMAL
PHOSPHATE SERPL-MCNC: 5.1 MG/DL (ref 2.5–4.9)
POTASSIUM SERPL-SCNC: 4.3 MMOL/L (ref 3.5–5.1)
SODIUM SERPL-SCNC: 137 MMOL/L (ref 136–145)

## 2024-08-27 PROCEDURE — 6360000002 HC RX W HCPCS: Performed by: STUDENT IN AN ORGANIZED HEALTH CARE EDUCATION/TRAINING PROGRAM

## 2024-08-27 PROCEDURE — 6370000000 HC RX 637 (ALT 250 FOR IP): Performed by: NURSE PRACTITIONER

## 2024-08-27 PROCEDURE — 94761 N-INVAS EAR/PLS OXIMETRY MLT: CPT

## 2024-08-27 PROCEDURE — 6360000002 HC RX W HCPCS: Performed by: INTERNAL MEDICINE

## 2024-08-27 PROCEDURE — 6370000000 HC RX 637 (ALT 250 FOR IP): Performed by: INTERNAL MEDICINE

## 2024-08-27 PROCEDURE — 97116 GAIT TRAINING THERAPY: CPT

## 2024-08-27 PROCEDURE — 80069 RENAL FUNCTION PANEL: CPT

## 2024-08-27 PROCEDURE — 97530 THERAPEUTIC ACTIVITIES: CPT

## 2024-08-27 PROCEDURE — 97110 THERAPEUTIC EXERCISES: CPT

## 2024-08-27 PROCEDURE — 2580000003 HC RX 258: Performed by: NURSE PRACTITIONER

## 2024-08-27 PROCEDURE — 2580000003 HC RX 258: Performed by: INTERNAL MEDICINE

## 2024-08-27 PROCEDURE — 6370000000 HC RX 637 (ALT 250 FOR IP): Performed by: STUDENT IN AN ORGANIZED HEALTH CARE EDUCATION/TRAINING PROGRAM

## 2024-08-27 PROCEDURE — 2700000000 HC OXYGEN THERAPY PER DAY

## 2024-08-27 PROCEDURE — 94640 AIRWAY INHALATION TREATMENT: CPT

## 2024-08-27 RX ORDER — LISINOPRIL 5 MG/1
5 TABLET ORAL DAILY
Status: DISCONTINUED | OUTPATIENT
Start: 2024-08-28 | End: 2024-08-27 | Stop reason: HOSPADM

## 2024-08-27 RX ADMIN — ERTAPENEM SODIUM 1000 MG: 1 INJECTION INTRAMUSCULAR; INTRAVENOUS at 13:25

## 2024-08-27 RX ADMIN — Medication 10 ML: at 08:21

## 2024-08-27 RX ADMIN — TORSEMIDE 20 MG: 20 TABLET ORAL at 08:20

## 2024-08-27 RX ADMIN — LISINOPRIL 10 MG: 10 TABLET ORAL at 08:19

## 2024-08-27 RX ADMIN — ALBUTEROL SULFATE 2.5 MG: 2.5 SOLUTION RESPIRATORY (INHALATION) at 10:36

## 2024-08-27 RX ADMIN — INSULIN LISPRO 8 UNITS: 100 INJECTION, SOLUTION INTRAVENOUS; SUBCUTANEOUS at 12:21

## 2024-08-27 RX ADMIN — MUPIROCIN: 20 OINTMENT TOPICAL at 08:20

## 2024-08-27 RX ADMIN — GABAPENTIN 300 MG: 300 CAPSULE ORAL at 08:20

## 2024-08-27 RX ADMIN — APIXABAN 5 MG: 5 TABLET, FILM COATED ORAL at 08:20

## 2024-08-27 RX ADMIN — BUDESONIDE AND FORMOTEROL FUMARATE DIHYDRATE 2 PUFF: 160; 4.5 AEROSOL RESPIRATORY (INHALATION) at 10:36

## 2024-08-27 RX ADMIN — TAMSULOSIN HYDROCHLORIDE 0.4 MG: 0.4 CAPSULE ORAL at 08:20

## 2024-08-27 RX ADMIN — GABAPENTIN 300 MG: 300 CAPSULE ORAL at 13:40

## 2024-08-27 RX ADMIN — CLOPIDOGREL BISULFATE 75 MG: 75 TABLET ORAL at 08:20

## 2024-08-27 RX ADMIN — PANTOPRAZOLE SODIUM 40 MG: 40 INJECTION, POWDER, FOR SOLUTION INTRAVENOUS at 08:21

## 2024-08-27 RX ADMIN — ATORVASTATIN CALCIUM 20 MG: 20 TABLET, FILM COATED ORAL at 08:20

## 2024-08-27 RX ADMIN — Medication 1 CAPSULE: at 08:19

## 2024-08-27 ASSESSMENT — PAIN SCALES - WONG BAKER
WONGBAKER_NUMERICALRESPONSE: NO HURT

## 2024-08-27 ASSESSMENT — PAIN SCALES - GENERAL: PAINLEVEL_OUTOF10: 0

## 2024-08-27 NOTE — PROGRESS NOTES
Data- discharge order received, pt verbalized agreement to discharge, needs for Home Health Care.    Action- AVS prepared, discharge instructions prepared and given to patient, medication information packet given r/t NEW or CHANGED prescriptions, pt verbalized understanding further self-review.   D/C instruction summary: Diet- general, Activity- as tolerated, follow up with Primary Care Physician Clinton Kerns -[phone] appointment in 1 week.    Response- Case Management/ reported faxing completed ELIZABETH and AVS to needed HHC/DME services stated above.   Pt belongings gathered, IV removed, pt dressed. Disposition is home with HHC/DME as stated above, transported with all belongings and paperwork, taken to lobby via w/c with paperwork, no complications.Pt picked up by brother, to transport home.     1. WEIGHT: Admit Weight - Scale: 117.9 kg (260 lb) (08/18/24 2026)        Today  Weight - Scale: 115.2 kg (254 lb) (08/27/24 0811)       2. O2 SAT.: SpO2: 90 % (08/27/24 1203)

## 2024-08-27 NOTE — PROGRESS NOTES
OPAT Nurse Coordinator Assessment Note      Primary ID Diagnosis:  Left foot infection   ID Provider: Dr. Fried      IV Antimicrobial Therapy Plan: IV ertapenem 1 g every 24 hours    IV Access: midline    Family Support: wife (family, neighbors)     Contact information: see [facility]    Outpatient services (including home infusion, home health, facility referral: See recent case management/social work note for finalization of services    ID follow up appointment:  9/5/24 @ noon in clinic       Patient Assessment    I spoke with patient at bedside and completed an initial assessment and education on OPAT program. I explained the OPAT program and its service (IV antimicrobial management, lab monitoring, follow up appointments reminders, etc.) to the patient. Patient states family cannot help with IV antimicrobial administration at home. Patient states he has self administered IV abx several times and feels comfortable doing it again without assistance from his wife. After this discussion, the patient appeared to be a good candidate for the OPAT program.     Patient was educated about how PICC line is placed, s/s of infection at the PICC line insertion site, how to keep the PICC line dressing dry while bathing, weightlifting limitations of 10 pounds or more, avoiding intense physical work and any repetitive motion in arm the PICC line was placed and how the PICC line dressing must be changed each week along with weekly lab monitoring. Patient was given information about IV antimicrobials including possible administration options (continuous, IV push, number of infusions) and estimated duration of therapy.     Patient verbalized understanding and provide teach back on assessment discussion stated above. Patient agreed for the OPAT team to leave messages on their cell phone.      OPAT program packet including contacts were left with patient in the event they would have questions or concerns. The OPAT nurse coordinator  will continue to follow the patient peripherally for any changes in the discharge plan. Please direct questions to myself, another member of the OPAT team, or the ID consulting provider via [employer] or by choosing option #2 when calling the office to speak with your MD's.    This note is not the final recommendations from the infectious diseases team, please refer to the most recent note for this information.

## 2024-08-27 NOTE — CARE COORDINATION
[facility] at [phone] spoke with Annette in Pharmacy.  Given orders for:   Name and dose of Antimicrobial: IV ertapenem 1 g every 24 hours   Antimicrobial completion date planned: 9/6/2024   Lab monitoring: CBC, Chem 12, ESR, CRP once a week, to be   collected every Monday or Tuesday morning until the patient is off IV  antibiotics. Fax weekly lab results to Eleno Fried MD's office at        [phone].       No other information required.  Latricia Mcgregor, MSN, RN, Case Management  Office: [phone]  Electronically signed by Latricia Mcgregor on 8/27/2024 at 12:55 PM

## 2024-08-27 NOTE — CARE COORDINATION
Case Management -  Discharge Note      Patient Name: Gamal Blue                   YOB: 1950            Readmission Risk (Low < 19, Mod (19-27), High > 27): Readmission Risk Score: 16.9    Current PCP: Clinton Kerns MD      (IMM) Important Message from Medicare:    Has pt received appropriate IMM before discharge if required: yes  Date: 08/26/2024    PT AM-PAC: 18 /24  OT AM-PAC: 19 /24    Home Care Information:   Is patient resuming current home health care services: Yes - [facility]  Phone: [phone]  Fax: [phone]      Services: PT/OT/Nursing  Home Health Order Obtained: Yes    Home health agency notified of discharge.       Financial    Payor: MEDICARE / Plan: MEDICARE PART A / Product Type: *No Product type* /     Pharmacy:  Potential assistance Purchasing Medications: No  Meds-to-Beds request: Yes    No Pharmacies Listed    Notes:    Additional Case Management Notes: After numerous phone calls from [facility] they confirmed they can start care tomorrow.     [facility] delivering medication and supplies to Patient's home this evening.     All CM needs met.       Electronically signed by DESTINY Sommer on 8/27/2024 at 4:02 PM

## 2024-08-27 NOTE — PROGRESS NOTES
[facility] - Inpatient Rehabilitation Department   Phone: [phone]    Physical Therapy    [] Initial Evaluation            [x] Daily Treatment Note         [] Discharge Summary      Patient: Gamal Blue   : 1950   MRN: [id number]   Date of Service:  2024  Admitting Diagnosis: Sepsis due to cellulitis (HCC)    Current Admission Summary: Gamal Blue is a(n) 73 y.o. male with past medical history as below   who arrives via EMS for recurrent falls.  Patient states that he was unable to get out of bed today due to generalized fatigue.  States that he has been on an oral antibiotic for cellulitis of the left lower extremity, but symptoms not improved affirms shortness of breath.  States that he does not wear oxygen at home.  He does affirm a history of a DVT that he used to take blood thinners for but states he does not take them anymore     Past Medical History:  has a past medical history of COPD (chronic obstructive pulmonary disease) (HCC), Diabetes mellitus (HCC), Hyperlipidemia, and Hypertension.  Past Surgical History:  has a past surgical history that includes Appendectomy.  Gamal Blue scored a 18/24 on the AM-PAC short mobility form. Current research shows that an AM-PAC score of 18 or greater is typically associated with a discharge to the patient's home setting. Based on the patient's AM-PAC score and their current functional mobility deficits, it is recommended that the patient have 2-3 sessions per week of Physical Therapy at d/c to increase the patient's independence.  At this time, this patient demonstrates the endurance and safety to discharge home with HHPT  and a follow up treatment frequency of 2-3x/wk.  Please see assessment section for further patient specific details.    HOME HEALTH CARE: LEVEL 1 STANDARD    - Initial home health evaluation to occur within 24-48 hours, in patient home   - Therapy to evaluate with goal of regaining prior level of functioning   -  complete transfers at stand by assistance - goal met 8/27/24  Patient will ambulate 25 ft with use of LRAD at contact guard assistance     New Goal:  Patient will complete transfers at Mod I    Above goals reviewed on 8/27/2024.  All goals are ongoing at this time unless indicated above.      Therapy Session Time      Individual Group Co-treatment   Time In 1155       Time Out 1252       Minutes 57         Timed Code Treatment Minutes:  57 Minutes  Total Treatment Minutes:  57 Minutes       Electronically Signed By: Aaron Chappell, SPT    PT providing direct supervision during session and assisting in making skilled judgements throughout session.     Melissa Benavidez PT, DPT [id number]

## 2024-08-27 NOTE — PROGRESS NOTES
[facility] - Inpatient Rehabilitation Department   Phone: [phone]    Occupational Therapy    [] Initial Evaluation            [x] Daily Treatment Note         [] Discharge Summary      Patient: Gamal Blue   : 1950   MRN: [id number]   Date of Service:  2024    Admitting Diagnosis:  Sepsis due to cellulitis (HCC)  Current Admission Summary: Per H&P \"Gamal Blue is a(n) 73 y.o. male with past medical history as below   who arrives via EMS for recurrent falls.  Patient states that he was unable to get out of bed today due to generalized fatigue.  States that he has been on an oral antibiotic for cellulitis of the left lower extremity, but symptoms not improved affirms shortness of breath.  States that he does not wear oxygen at home.  He does affirm a history of a DVT that he used to take blood thinners for but states he does not take them anymore \"  Past Medical History:  has a past medical history of COPD (chronic obstructive pulmonary disease) (HCC), Diabetes mellitus (HCC), Hyperlipidemia, and Hypertension.  Past Surgical History:  has a past surgical history that includes Appendectomy.    Discharge Recommendations: Gamal Blue scored a 19/24 on the AM-PAC ADL Inpatient form. Current research shows that an AM-PAC score of 18 or greater is typically associated with a discharge to the patient's home setting. Based on the patient's AM-PAC score, and their current ADL deficits, it is recommended that the patient have 2-3 sessions per week of Occupational Therapy at d/c to increase the patient's independence.  At this time, this patient demonstrates the endurance and safety to discharge home with home services (home vs OP services) and a follow up treatment frequency of 2-3x/wk.   Please see assessment section for further patient specific details.    If patient discharges prior to next session this note will serve as a discharge summary.  Please see below for the latest assessment

## 2024-08-27 NOTE — CARE COORDINATION
Discharge Planning:     (JAKE) called [facility] discharge Planner [phone] ext. [phone] and LVM asking for a phone call back in regards to Patient. CM provided call back information.       Electronically signed by DESTINY Sommer on 8/27/2024 at 9:15 AM      UPDATE :     JAKE rec'd a call back from [place] [facility] who advised [facility]y [facility] with [facility] is trying to get ahold of SW. [place] provided Katy Flor's number [phone].     JAKE called Katy Winston and provided her with all information she needed. She advised she would complete everything she needed on her end in order for Patient to D/C home TODAY. Katy Winston advised she needed to reach out to [facility] along with [employer] and would call CM back once everything is in order. JAKE provided her call back information.       Electronically signed by DESTINY Sommer on 8/27/2024 at 10:16 AM

## 2024-08-27 NOTE — DISCHARGE SUMMARY
Hospital Medicine Discharge Summary    Patient ID: Gamal Blue      Patient's PCP: Clinton Kerns MD    Admit Date: 8/18/2024     Discharge Date:   8/27/2024     Admitting Provider: Monica Lutz DO     Discharge Provider: Alberto Ng MD     Discharge Diagnoses:       Active Hospital Problems    Diagnosis     Bacterial infection due to Streptococcus, group G [B95.4]     COPD exacerbation (HCC) [J44.1]     Acute on chronic respiratory failure with hypoxia (HCC) [J96.21]     Acute kidney injury superimposed on chronic kidney disease (HCC) [N17.9, N18.9]     Type 2 diabetes mellitus (HCC) [E11.9]     Obesity with sleep apnea [G47.30, E66.9]     CAD (coronary artery disease) [I25.10]     Cellulitis of left leg [L03.116]     Sepsis due to cellulitis (HCC) [L03.90, A41.9]        The patient was seen and examined on day of discharge and this discharge summary is in conjunction with any daily progress note from day of discharge.    Hospital Course:   The patient is a 78-year-old gentleman with history of COPD, type 2 diabetes, CAD, stage III CKD, obesity with sleep apnea who was admitted with left lower extremity cellulitis complicated by sepsis and left diabetic foot.  He had COPD exacerbation with respiratory failure which has since improved to baseline.  He had acute on chronic kidney injury and renal function has since returned to baseline.     Sepsis due to cellulitis and Left lower extremity cellulitis  Left lower extremity with erythema over the shin as well as the foot with disclamation of the skin on the toes, fluctuance noted.  Concerning for abscess.  He was consulted by podiatry and infectious disease.  He will continue on antibiotics as per ID and outpatient follow-up with podiatry and ID.  Continue pain management.         COPD exacerbation and Acute on chronic respiratory failure with hypoxia (HCC)  On presentation, patient had desaturations of 85% on room air.  At baseline

## 2024-08-27 NOTE — PROGRESS NOTES
[facility] Note    Patient Active Problem List   Diagnosis    Sepsis due to cellulitis (HCC)    COPD exacerbation (HCC)    Acute on chronic respiratory failure with hypoxia (HCC)    Acute kidney injury superimposed on chronic kidney disease (HCC)    Elevated brain natriuretic peptide (BNP) level    Type 2 diabetes mellitus (HCC)    Obesity with sleep apnea    CAD (coronary artery disease)    Septicemia (HCC)    Elevated sed rate    CRP elevated    Cellulitis of left leg    Complex care coordination    Gram-negative infection    Bacterial infection due to Streptococcus, group G    Receiving intravenous antibiotic treatment as outpatient       Past Medical History:   has a past medical history of COPD (chronic obstructive pulmonary disease) (HCC), Diabetes mellitus (HCC), Hyperlipidemia, and Hypertension.    Past Social History:   reports that he has been smoking cigarettes. He has never used smokeless tobacco. He reports that he does not drink alcohol and does not use drugs.    Subjective:    Noted resting comfortably in room   Has bilateral leg swelling.   Good urine output      Objective:    24-hour urine output 3.4 L.  -11.6L since admission.  Weight down to 115.2 kg from peak of 122.5 kg  /68   Pulse 82   Temp 97.2 °F (36.2 °C) (Temporal)   Resp 18   Ht 1.727 m (5' 8\")   Wt 115.2 kg (254 lb)   SpO2 94%   BMI 38.62 kg/m²     Wt Readings from Last 3 Encounters:   08/27/24 115.2 kg (254 lb)   01/01/22 117.9 kg (260 lb)   10/23/17 112.5 kg (248 lb)       BP Readings from Last 3 Encounters:   08/27/24 113/68   01/01/22 (!) 107/54   10/23/17 (!) 178/118     Chest- clear  Heart-regular  Abd-soft  Ext-1-2+ edema    Labs  Hemoglobin   Date Value Ref Range Status   08/25/2024 10.0 (L) 13.5 - 17.5 g/dL Final     Hematocrit   Date Value Ref Range Status   08/25/2024 31.0 (L) 40.5 - 52.5 % Final     WBC   Date Value Ref Range Status   08/25/2024 10.9 4.0

## 2024-08-28 ENCOUNTER — TELEPHONE (OUTPATIENT)
Dept: INFECTIOUS DISEASES | Age: 74
End: 2024-08-28

## 2024-08-28 ENCOUNTER — CLINICAL DOCUMENTATION (OUTPATIENT)
Dept: INFECTIOUS DISEASES | Age: 74
End: 2024-08-28

## 2024-08-28 DIAGNOSIS — B95.4 BACTERIAL INFECTION DUE TO STREPTOCOCCUS, GROUP G: Primary | ICD-10-CM

## 2024-08-28 NOTE — TELEPHONE ENCOUNTER
Received call from [facility] nurse Deandra that she arrived to do his SOC and patient was on the ground with EMS as his life alert called them his BS was 31. His home was covered in cat feces and bugs. He was transferred to [facility] and she gave them report regarding his IV meds.

## 2024-08-28 NOTE — PROGRESS NOTES
Dr. Fried has placed a referral order for pharmacist to manage Outpatient Parental Antimicrobial Therapy (OPAT) pursuant the ID Collaborative Practice Agreement.     Pertinent PMH and HPI:  PMH CAD, HF, COPD, T2DM, BPH, UTI, VTE, OA, CKD, GERD, TIA, cellulitis, frequent falls    Presents  with weakness + LLE cellulitis (on PO abx?)  [facility]      Pertinent Objective Data:    Wt Readings from Last 1 Encounters:   24 115.2 kg (254 lb)      BMI Readings from Last 1 Encounters:   24 38.62 kg/m²      Serum creatinine: 1.5 mg/dL (H) 24 0430  Estimated creatinine clearance: 54 mL/min (A)  This is patient's baseline     Lab Results   Component Value Date    .4 (H) 2024       Lab Results   Component Value Date    SEDRATE 62 (H) 2024       Lab Results   Component Value Date    CKTOTAL 300 2024       Imagin/18 CXRAY:  IMPRESSION:  Bilateral patchy interstitial opacities, concerning for pulmonary edema or  multifocal pneumonia.    MRI L foot:  IMPRESSION:  1.  No evidence of osteomyelitis or abscess.     2.  Significant soft tissue swelling over the dorsum of the foot       Micro:   BC NGTD   wound swab: coryne striatum, alcaligenes faecalis, group G strep   Alcaligenes faecalis ssp faecalis (3)    Antibiotic Interpretation Microscan    cefepime Intermediate 16 mcg/mL   cefTRIAXone Sensitive <=1 mcg/mL   ciprofloxacin Intermediate 2 mcg/mL   gentamicin Resistant <=2 mcg/mL   levofloxacin Sensitive <=0.5 mcg/mL   meropenem Sensitive <=1 mcg/mL   piperacillin-tazobactam Sensitive <=8 mcg/mL   tobramycin Sensitive 4 mcg/mL   trimethoprim-sulfamethoxazole Sensitive <=0.5/9.5 mcg/mL       OPAT Orders:  Diagnosis  L DFI without OM   Antimicrobial Regimen and Projected Term Date IV ertapenem 1gm daily-       (3 weeks)     Weekly Lab Monitoring CBCwDiff, CMP, CRP, and ESR   Any additional imaging or data needed prior to term? None   Any follow up in  ID clinic? 9/5   OPAT Access/Additional LDA L single lumen midline    Disposition  [facility] / [facility]     Lab and medication orders have been placed.    Clinical Pharmacist will review patient weekly or as needed and make recommendations regarding the above therapy plan.     Thank you,  Connie Neves, PharmD, [facility]  Clinical Pharmacy Specialist- [facility]ious [facility]  Office Phone: [phone] (also available on [employer])  Fax: [phone]    For Pharmacy Admin Tracking Only    Program: Medical Group  CPA in place: Yes  Time Spent (min): 20         Patient and/or caregiver has verbally consented to have drug therapy managed by a pharmacist. The benefits and risks of complex antimicrobial therapy including drug-specific adverse reactions and necessary follow-up were discussed with patient and/or caregiver and they are amenable to OPAT and pharmacist management.

## 2024-08-29 ENCOUNTER — TELEPHONE (OUTPATIENT)
Dept: INFECTIOUS DISEASES | Age: 74
End: 2024-08-29

## 2024-08-29 NOTE — TELEPHONE ENCOUNTER
Spoke with [facility] who states patient was not admitted. Call placed to patient who states he was given a turkey sandwich and released. He states he was not given his antibiotic and has not been able to get ahold of his [facility] agency to come do his SOC and place extension tubing so he can administer his ertapenem. Discussed cat feces in the home and he states he realizes it needs to be cleaned and he is working on it. He is has been so focused on getting his wife to  for a transfusion and moved to another nursing home that he has not been able to clean his house but states he does not walk barefoot. He wants to hire a cleaning crew and will do that asap.    Call placed to [facility] and spoke with the director Deandra who states she does not feel patient is appropriate for [facility]. Explained patient is cognitively able to administer his medication and if her agency does not want to accept his administration due to the condition of his home we will find another agency to admit him. She states she will discuss this with her company and return call to this nurse

## 2024-08-30 NOTE — TELEPHONE ENCOUNTER
No return call received from [facility] yesterday. Unable to reach patient this AM to see if he received a visit. Received call back from patient who states he did not hear from [facility] agency and has not had his IV abx since leaving the hospital.     Call placed to [facility] asked to speak to Deandra.  states she is out sick and they are not opening the patient. Asked to speak with another manager and transferred to Marlen[facility] who states they are not accepting the patient because he cannot feed himself which resulted in his Bs being 31. Informed her patient can infact feed himself and he has been independent in the community and driving to his wife's SNF and BS has been stable since returning home from the ER. She continues to refuse to accept the patient    Spoke with Jordyn liaroldan for [facility] who states she will contact [facility] to find an alternate agency. Patient updated on above

## 2024-08-30 NOTE — TELEPHONE ENCOUNTER
Spoke with patient who states he just needs an extension placed on his PICC line and he will be able to administer IV abx. Patient came to clinic and this nurse flushed PICC line without difficulty, blood return noted. Extension tubing placed per protocol and reinforced teaching about IV abx administration with elastomeric ball and he v/u.     Spoke with [facility] JAKE and advised of [facility] rejecting patient and need for alternate [facility] agency. She states she will make calls and return call to this nurse

## 2024-09-04 ENCOUNTER — TELEPHONE (OUTPATIENT)
Dept: INFECTIOUS DISEASES | Age: 74
End: 2024-09-04

## 2024-09-04 NOTE — TELEPHONE ENCOUNTER
OPAT Nurse Coordinator Weekly Update Note    Current OPAT plan:  IV ertapenem 1 g every 24 hours through 9/5    Diagnosis:  Left foot infection    Assessment:  Spoke with Pat at [facility] requesting weekly lab results. She states they have been unable to get ahold of patient to schedule a visit    Spoke with patient who states he has not gotten the message. Stressed the importance of adhering to [facility] nurse visit schedule for midline dressing change and labs. Patient will bring dressing kit to f/u appt tomorrow. He states he has been afebrile and his left foot wound is drying up. He will be at appt tomorrow for further assessment    Follow up appts:  Dr. Fried 9/5

## 2024-09-05 ENCOUNTER — TELEPHONE (OUTPATIENT)
Dept: INFECTIOUS DISEASES | Age: 74
End: 2024-09-05

## 2024-09-05 NOTE — TELEPHONE ENCOUNTER
Patient arrived at clinic for f/u appt and  states [facility] this visit. Patient states he will not see physician if he has to pay full price. He would like to DC care with [facility] and [facility].   Patient has been noncompliant with [facility] visits and has had no labs drawn, midline dressing changes and has not been changing his left foot dressing as ordered.   Discussed with Dr. Fried who offered to do a one time free visit. Patient v/u but states the remainder of his care will not be covered. He will contact his PCP and get reestablished with ID there. He will have the [facility] contact this nurse if any information is needed to continue care there, he has this nurse's phone number if needed    PICC line Removal Note:     The midline was removed from left arm after the site was prepped and cleaned with alcohol swabs per protocol. The catheter tip was visualized and intact. Pressure dressing applied with sterile dressing and the site was draped with Coban.     No redness, ecchymosis, edema, swelling, or drainage noted at site.     Instructions were provided on post midline discharge care, including followup notification instructions.      OPAT End    Call made to pharmacy: Aldair at [facility]     Call made to A:WILLY Patino at [facility]

## 2024-12-13 NOTE — CARE COORDINATION
Discharge Planning:     (CM) was informed of discharge order. CM reviewed chart. CM placed call [facility], Anne Marie [phone], LVM requesting return call to CM office or call [phone], Anne Marie leaves an extra number on  for assistance if she is not available.   CM call that number [phone] this number is after hours administration, he was unable to assist, reporting DC Planner only available during the week. [facility] to [facility] spoke to Ra Luna he reports is unsure who can assist with this on a weekend.     Electronically signed by Brittani Garcia on 8/24/2024 at 3:37 PM    Universal Safety Interventions